# Patient Record
Sex: MALE | Race: WHITE
[De-identification: names, ages, dates, MRNs, and addresses within clinical notes are randomized per-mention and may not be internally consistent; named-entity substitution may affect disease eponyms.]

---

## 2018-06-11 ENCOUNTER — HOSPITAL ENCOUNTER (EMERGENCY)
Dept: HOSPITAL 25 - UCEAST | Age: 73
Discharge: HOME | End: 2018-06-11
Payer: MEDICARE

## 2018-06-11 ENCOUNTER — HOSPITAL ENCOUNTER (INPATIENT)
Dept: HOSPITAL 25 - ED | Age: 73
LOS: 5 days | Discharge: HOME | DRG: 606 | End: 2018-06-16
Attending: HOSPITALIST | Admitting: HOSPITALIST
Payer: MEDICARE

## 2018-06-11 VITALS — DIASTOLIC BLOOD PRESSURE: 57 MMHG | SYSTOLIC BLOOD PRESSURE: 102 MMHG

## 2018-06-11 DIAGNOSIS — Z88.1: ICD-10-CM

## 2018-06-11 DIAGNOSIS — Z87.01: ICD-10-CM

## 2018-06-11 DIAGNOSIS — I10: ICD-10-CM

## 2018-06-11 DIAGNOSIS — R53.83: ICD-10-CM

## 2018-06-11 DIAGNOSIS — L98.2: Primary | ICD-10-CM

## 2018-06-11 DIAGNOSIS — Z86.711: ICD-10-CM

## 2018-06-11 DIAGNOSIS — D61.818: ICD-10-CM

## 2018-06-11 DIAGNOSIS — D70.9: ICD-10-CM

## 2018-06-11 DIAGNOSIS — I77.819: ICD-10-CM

## 2018-06-11 DIAGNOSIS — Z87.442: ICD-10-CM

## 2018-06-11 DIAGNOSIS — Z79.01: ICD-10-CM

## 2018-06-11 DIAGNOSIS — I81: ICD-10-CM

## 2018-06-11 DIAGNOSIS — I77.6: ICD-10-CM

## 2018-06-11 DIAGNOSIS — Z72.89: ICD-10-CM

## 2018-06-11 DIAGNOSIS — L94.8: ICD-10-CM

## 2018-06-11 DIAGNOSIS — Z86.718: ICD-10-CM

## 2018-06-11 DIAGNOSIS — J45.909: ICD-10-CM

## 2018-06-11 DIAGNOSIS — R50.9: Primary | ICD-10-CM

## 2018-06-11 DIAGNOSIS — Z85.46: ICD-10-CM

## 2018-06-11 DIAGNOSIS — R91.8: ICD-10-CM

## 2018-06-11 DIAGNOSIS — Z86.14: ICD-10-CM

## 2018-06-11 DIAGNOSIS — R63.4: ICD-10-CM

## 2018-06-11 DIAGNOSIS — I82.891: ICD-10-CM

## 2018-06-11 DIAGNOSIS — D64.9: ICD-10-CM

## 2018-06-11 DIAGNOSIS — Z88.8: ICD-10-CM

## 2018-06-11 DIAGNOSIS — K76.0: ICD-10-CM

## 2018-06-11 DIAGNOSIS — Z80.3: ICD-10-CM

## 2018-06-11 DIAGNOSIS — R50.81: ICD-10-CM

## 2018-06-11 DIAGNOSIS — F41.9: ICD-10-CM

## 2018-06-11 DIAGNOSIS — I08.1: ICD-10-CM

## 2018-06-11 DIAGNOSIS — D68.61: ICD-10-CM

## 2018-06-11 DIAGNOSIS — N40.0: ICD-10-CM

## 2018-06-11 DIAGNOSIS — Z79.52: ICD-10-CM

## 2018-06-11 DIAGNOSIS — R21: ICD-10-CM

## 2018-06-11 LAB
BASOPHILS # BLD AUTO: 0 10^3/UL (ref 0–0.2)
EOSINOPHIL # BLD AUTO: 0 10^3/UL (ref 0–0.6)
HCT VFR BLD AUTO: 25 % (ref 42–52)
HGB BLD-MCNC: 8.8 G/DL (ref 14–18)
INR PPP/BLD: 1.66 (ref 0.77–1.02)
LYMPHOCYTES # BLD AUTO: 0.2 10^3/UL (ref 1–4.8)
MCH RBC QN AUTO: 37 PG (ref 27–31)
MCHC RBC AUTO-ENTMCNC: 35 G/DL (ref 31–36)
MCV RBC AUTO: 106 FL (ref 80–94)
MONOCYTES # BLD AUTO: 0.4 10^3/UL (ref 0–0.8)
NEUTROPHILS # BLD AUTO: 2.2 10^3/UL (ref 1.5–7.7)
PLATELET # BLD AUTO: 90 10^3/UL (ref 150–450)
RBC # BLD AUTO: 2.38 10^6/UL (ref 4–5.4)
WBC # BLD AUTO: 2.7 10^3/UL (ref 3.5–10.8)

## 2018-06-11 PROCEDURE — 83615 LACTATE (LD) (LDH) ENZYME: CPT

## 2018-06-11 PROCEDURE — 85025 COMPLETE CBC W/AUTO DIFF WBC: CPT

## 2018-06-11 PROCEDURE — 36415 COLL VENOUS BLD VENIPUNCTURE: CPT

## 2018-06-11 PROCEDURE — 88187 FLOWCYTOMETRY/READ 2-8: CPT

## 2018-06-11 PROCEDURE — 88313 SPECIAL STAINS GROUP 2: CPT

## 2018-06-11 PROCEDURE — 82595 ASSAY OF CRYOGLOBULIN: CPT

## 2018-06-11 PROCEDURE — 88311 DECALCIFY TISSUE: CPT

## 2018-06-11 PROCEDURE — 85097 BONE MARROW INTERPRETATION: CPT

## 2018-06-11 PROCEDURE — 88346 IMFLUOR 1ST 1ANTB STAIN PX: CPT

## 2018-06-11 PROCEDURE — 88350 IMFLUOR EA ADDL 1ANTB STN PX: CPT

## 2018-06-11 PROCEDURE — 85652 RBC SED RATE AUTOMATED: CPT

## 2018-06-11 PROCEDURE — 85060 BLOOD SMEAR INTERPRETATION: CPT

## 2018-06-11 PROCEDURE — 80053 COMPREHEN METABOLIC PANEL: CPT

## 2018-06-11 PROCEDURE — G0378 HOSPITAL OBSERVATION PER HR: HCPCS

## 2018-06-11 PROCEDURE — 87798 DETECT AGENT NOS DNA AMP: CPT

## 2018-06-11 PROCEDURE — 94640 AIRWAY INHALATION TREATMENT: CPT

## 2018-06-11 PROCEDURE — 85045 AUTOMATED RETICULOCYTE COUNT: CPT

## 2018-06-11 PROCEDURE — 82585 ASSAY OF CRYOFIBRINOGEN: CPT

## 2018-06-11 PROCEDURE — 86160 COMPLEMENT ANTIGEN: CPT

## 2018-06-11 PROCEDURE — G0463 HOSPITAL OUTPT CLINIC VISIT: HCPCS

## 2018-06-11 PROCEDURE — 86162 COMPLEMENT TOTAL (CH50): CPT

## 2018-06-11 PROCEDURE — 99212 OFFICE O/P EST SF 10 MIN: CPT

## 2018-06-11 PROCEDURE — 99283 EMERGENCY DEPT VISIT LOW MDM: CPT

## 2018-06-11 PROCEDURE — 86880 COOMBS TEST DIRECT: CPT

## 2018-06-11 PROCEDURE — 83516 IMMUNOASSAY NONANTIBODY: CPT

## 2018-06-11 PROCEDURE — 84484 ASSAY OF TROPONIN QUANT: CPT

## 2018-06-11 PROCEDURE — 81479 UNLISTED MOLECULAR PATHOLOGY: CPT

## 2018-06-11 PROCEDURE — 88305 TISSUE EXAM BY PATHOLOGIST: CPT

## 2018-06-11 PROCEDURE — 85610 PROTHROMBIN TIME: CPT

## 2018-06-11 PROCEDURE — 81003 URINALYSIS AUTO W/O SCOPE: CPT

## 2018-06-11 PROCEDURE — 88184 FLOWCYTOMETRY/ TC 1 MARKER: CPT

## 2018-06-11 PROCEDURE — 83605 ASSAY OF LACTIC ACID: CPT

## 2018-06-11 PROCEDURE — 38222 DX BONE MARROW BX & ASPIR: CPT

## 2018-06-11 PROCEDURE — 83036 HEMOGLOBIN GLYCOSYLATED A1C: CPT

## 2018-06-11 PROCEDURE — 86658 ENTEROVIRUS ANTIBODY: CPT

## 2018-06-11 PROCEDURE — 71045 X-RAY EXAM CHEST 1 VIEW: CPT

## 2018-06-11 PROCEDURE — 88188 FLOWCYTOMETRY/READ 9-15: CPT

## 2018-06-11 PROCEDURE — 82607 VITAMIN B-12: CPT

## 2018-06-11 PROCEDURE — 86645 CMV ANTIBODY IGM: CPT

## 2018-06-11 PROCEDURE — 85730 THROMBOPLASTIN TIME PARTIAL: CPT

## 2018-06-11 PROCEDURE — 99232 SBSQ HOSP IP/OBS MODERATE 35: CPT

## 2018-06-11 PROCEDURE — 86225 DNA ANTIBODY NATIVE: CPT

## 2018-06-11 PROCEDURE — 87040 BLOOD CULTURE FOR BACTERIA: CPT

## 2018-06-11 PROCEDURE — 88189 FLOWCYTOMETRY/READ 16 & >: CPT

## 2018-06-11 PROCEDURE — 88271 CYTOGENETICS DNA PROBE: CPT

## 2018-06-11 PROCEDURE — 86140 C-REACTIVE PROTEIN: CPT

## 2018-06-11 PROCEDURE — 80048 BASIC METABOLIC PNL TOTAL CA: CPT

## 2018-06-11 PROCEDURE — 86038 ANTINUCLEAR ANTIBODIES: CPT

## 2018-06-11 PROCEDURE — 86747 PARVOVIRUS ANTIBODY: CPT

## 2018-06-11 PROCEDURE — 86644 CMV ANTIBODY: CPT

## 2018-06-11 RX ADMIN — MOMETASONE FUROATE AND FORMOTEROL FUMARATE DIHYDRATE SCH: 100; 5 AEROSOL RESPIRATORY (INHALATION) at 20:18

## 2018-06-11 RX ADMIN — CIPROFLOXACIN SCH MG: 500 TABLET, FILM COATED ORAL at 21:46

## 2018-06-11 RX ADMIN — MOMETASONE FUROATE AND FORMOTEROL FUMARATE DIHYDRATE SCH PUFF: 100; 5 AEROSOL RESPIRATORY (INHALATION) at 20:39

## 2018-06-11 RX ADMIN — AMITRIPTYLINE HYDROCHLORIDE SCH MG: 10 TABLET, FILM COATED ORAL at 21:46

## 2018-06-11 NOTE — UC
Skin Complaint HPI





- HPI Summary


HPI Summary: 


patient is on prednisone for an auto immune disorder (possibly vasculitis) and 

Cipro---patient has had fevers, and is developing more crusting rash on face, 

arms, hands and neck--, temp 101.5 last night   neither Dr. Diamond or Ashish 

were available to see today so he was advised to go to urgent care








- History of Current Complaint


Chief Complaint: UCGeneralIllness


Time Seen by Provider: 06/11/18 10:33


Stated Complaint: FEVER, SWELLING ON LIPS


Hx Obtained From: Patient


Onset/Duration: Gradual Onset, Lasting Weeks, Worse Since - last night


Timing: Constant


Pain Intensity: 0


Pain Scale Used: 0-10 Numeric


Location: Diffuse


Character: Redness, Raised


Aggravating Factor(s): Nothing


Alleviating Factor(s): Nothing


Associated Signs & Symptoms: Positive: Rash





- Allergy/Home Medications


Allergies/Adverse Reactions: 


 Allergies











Allergy/AdvReac Type Severity Reaction Status Date / Time


 


allopurinol Allergy  Rash Verified 06/11/18 11:47


 


azithromycin [From Zithromax] Allergy  Rash Verified 06/11/18 11:47


 


ssri Allergy Intermediate Hallucinati Uncoded 06/11/18 11:47





   ons  











Home Medications: 


 Home Medications





Amitriptyline TAB* [Elavil TAB*] 20 mg PO BEDTIME 06/11/18 [History Confirmed 06 /11/18]


Budesonide/Formote 80/4.5(NF) [Symbicort 80/4.5 (NF)] 2 puff INH BID 06/11/18 [

History Confirmed 06/11/18]


Ciprofloxacin TAB* [Cipro 500 MG TAB*] 1 tab PO BID 06/11/18 [History Confirmed 

06/11/18]


Docusate Sodium [Stool Softener] 100 mg PO DAILY PRN 06/11/18 [History 

Confirmed 06/11/18]


Ibuprofen TAB* [Advil TAB*] 600 mg PO Q6HR PRN 06/11/18 [History Confirmed 06/11 /18]


LORazepam [Ativan 0.5 MG TAB] 0.5 mg PO DAILY PRN 06/11/18 [History Confirmed 06 /11/18]


Magnesium Hydroxide LIQ* [Milk of Magnesia LIQ*] 30 ml PO DAILY PRN 06/11/18 [

History Confirmed 06/11/18]


Omeprazole CAP* [Prilosec CAP* 20 MG] 20 mg PO DAILY 06/11/18 [History 

Confirmed 06/11/18]


Oxybutynin Chloride [Oxybutynin Chloride ER] 10 mg PO DAILY 06/11/18 [History 

Confirmed 06/11/18]


Rivaroxaban TAB(*) [Xarelto 20 mg] 20 mg PO DAILY 06/11/18 [History Confirmed 06 /11/18]


predniSONE TAB* [Deltasone 10 MG TAB*] 30 mg PO DAILY 06/11/18 [History 

Confirmed 06/11/18]











Review of Systems


Constitutional: Fever, Chills, Fatigue


Skin: Rash


Eyes: Negative


ENT: Negative


Respiratory: Negative


Cardiovascular: Negative


Gastrointestinal: Negative


Genitourinary: Negative


Motor: Negative


Neurovascular: Negative


Musculoskeletal: Negative


Neurological: Negative


Psychological: Negative


Is Patient Immunocompromised?: No


All Other Systems Reviewed And Are Negative: Yes





PMH/Surg Hx/FS Hx/Imm Hx


Previously Healthy: No - DVT, Vasculitis


Respiratory History: Asthma


Cancer History: Prostate Cancer





- Surgical History


Surgical History: Yes


Surgery Procedure, Year, and Place: LT INGUINAL HERNIA REPAIR, LT KIDNEY SX FOR 

NEPHROLITHIASIS REMOVED A LARGE STONE 42 YRS AGO, APPENDECTOMY 20 YEARS AGO.  

prostate Bx 06/17/14 at [facility] in [facility]; PROSTATECTOMY 8/2014; 2X 

COLONOSCOPIES 3/21/16 POLYP REMOVED -BENIGN -[facility].  URTHRA STRICTURE - REPAIR -11 /2015.  CRANIOTOMY - FOR I & D OF INFECTION- TITANIUM SCREWS IN SKULL dvt right 

leg and lung





- Family History


Known Family History: Positive: None





- Social History


Occupation: Retired


Lives: With Family


Alcohol Use: Occasionally


Substance Use Type: None


Smoking Status (MU): Never Smoked Tobacco





- Immunization History


Most Recent Influenza Vaccination: 2013


Most Recent Tetanus Shot: unknown


Most Recent Pneumonia Vaccination: 2006





Physical Exam


Triage Information Reviewed: Yes


Appearance: No Pain Distress, Ill-Appearing, Thin


Vital Signs: 


 Initial Vital Signs











Temp  100.4 F   06/11/18 10:18


 


Pulse  135   06/11/18 10:18


 


Resp  22   06/11/18 10:18


 


BP  102/57   06/11/18 10:18


 


Pulse Ox  100   06/11/18 10:18











Vital Signs Reviewed: Yes


Eye Exam: Normal


Eyes: Positive: Conjunctiva Clear


ENT Exam: Normal


ENT: Positive: Normal ENT inspection, Hearing grossly normal.  Negative: Trismus

, Muffled voice, Hoarse voice


Dental Exam: Normal


Neck exam: Normal


Neck: Positive: Supple, Nontender


Respiratory Exam: Normal


Respiratory: Positive: Chest non-tender, No respiratory distress, No accessory 

muscle use


Cardiovascular Exam: Normal


Cardiovascular: Positive: Pulses Normal, Brisk Capillary Refill, Tachycardia


Musculoskeletal Exam: Normal


Musculoskeletal: Positive: Strength Intact, ROM Intact


Neurological Exam: Normal


Neurological: Positive: Alert, Muscle Tone Normal


Psychological Exam: Normal


Skin Exam: Other


Skin: Positive: rashes





Course/Dx





- Course


Course Of Treatment: to ED with wife driving for further care ,and evaluation





- Diagnoses


Provider Diagnoses: Fever of unknown origin





Discharge





- Sign-Out/Discharge


Documenting (check all that apply): Discharge/Admit/Transfer





- Discharge Plan


Condition: Stable


Disposition: HOME


Patient Education Materials:  Fever in Adults (ED), Autoimmune Disease (ED)


Referrals: 


Angelito Villanueva MD [Primary Care Provider] - 


Additional Instructions: 


Please go directly to the emergency department  for comprehensive assessment of 

symptoms





- Billing Disposition and Condition


Condition: STABLE


Disposition: Home

## 2018-06-11 NOTE — HP
CC:  Dr. Bassem Diamond; Dr. Angelito Villanueva; Dr. Tu Gonsalves *

 

HISTORY AND PHYSICAL:

 

DATE OF ADMISSION:  06/11/18

 

PRIMARY CARE PROVIDER:  Dr. Angelito Villanueva.

 

ATTENDING PHYSICIAN:  Zoey Caldwell DO * (dictated by Lara Acosta NP).

 

CHIEF COMPLAINT:  Fever, fatigue, and rash.

 

HISTORY OF PRESENT ILLNESS:  Mr. Mehta is a 73-year-old male with past medical 
history significant for hypertension, BPH, MRSA pneumonia with subsequent MRSA 
brain abscess, DVT, PE, portal vein thrombus, prostate cancer, anxiety, 
pulmonary nodule, and macrocytic anemia who has underwent an extensive workup 
over the last few months.  The patient reports consistent weight loss and 
fatigue.  Additionally, he has intermittently had a rash on his torso (
previously described as a diffuse papular rash) and now on his face, hands, 
soles, back of his neck and arms.  In the past, it was felt that the patient 
had a diffuse inflammatory process happening.  He also underwent Pulmonology 
and Infectious Disease consult and workup due to his pulmonary nodule and 
diffuse papular rash. 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 
vasculitis with renal involvement. It was recommended that he undergo a renal 
biopsy.  At this time, it is unclear why, but he has not undergone this biopsy.
  The patient continues to follow with Dr. Gonsalves for hematological issues and 
Dr. 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  The patient has been on 
60 mg of prednisone and which he was started on around 03/23/18 or 03/24/18, 
when he had a positive ANCA.  He was then decreased around the time of 05/05/18 
to 40 mg oral daily.  The patient was feeling so well that he decreased himself 
down to 30 mg oral daily approximately 1 to 2 weeks ago. Exactly 1 week ago, on 
Monday, the patient developed a 101 fever.  He was seen by his primary care 
provider and started on Cipro.  The patient had been feeling relatively well 
for the last week and then this morning, developed a 101.5 temperature 
overnight.  His wife gave him Motrin and the fever resolved.  He reports fevers 
and chills have now resolved.  He denies chest pain, cough, shortness of breath
, nausea, vomiting, diarrhea, abdominal pain.  He denies any known tick bites.  
He reports having a workup for possible Lyme's that was negative.  According to 
the EMR, the patient's Lyme workup was all negative back in March of this year.
  The patient states that he has not seen Dermatology for his rash, but has 
seen multiple other specialities.  He reports a rash for approximately 1 week.  
This includes starting with blister-like areas on his lip bilateral and has 
progressed over the last week to being on the backs of his hands and on his 
arms and is a raised, slightly erythematous rash.  On his neck, he has raised 
rashes that appeared to be fluid filled.  He has areas on his ears that 
appeared to be raised and look like hives.  His toes and palms have a small 
dark pigmented areas.  He has no rash on his chest or back.  Denies any sores 
in his mouth at this time.  Due to his persistent rash, he tried to get into 
Dr. Gonsalves and Dr. Diamond's office today and was unable to and they deferred him 
to Urgent Care.  The patient was seen at Urgent Care, who then deferred the 
patient to the emergency room.

 

While in the emergency room, the patient had labs that were remarkable for 
white blood cell count of 7.9, hemoglobin of 8.8, hematocrit 25, platelet count 
90.  CRP of 100.61.  His basic metabolic panel was unremarkable.  He had a 
negative urinalysis.  He had a chest x-ray showing no acute disease.  Dr. Munoz contacted Dr. Diamond who felt that the patient should be admitted and 
stated he will follow up and consult on the patient tomorrow.  The patient has 
been afebrile in the emergency room.  The Hospitalists were asked to evaluate 
the patient for admission.

 

PAST MEDICAL HISTORY:

1.  Hypertension.

2.  BPH.

3.  MRSA.

4.  Pneumonia with subsequent MRSA brain abscess.

5.  History of DVT.

6.  History of PE.

7.  Portal vein thrombosis.

8.  Prostate cancer.

9.  Anxiety.

10.  Pulmonary nodules.

11.  Macrocytic anemia.

 

PAST SURGICAL HISTORY:

1.  Status post left inguinal hernia repair.

2.  Status post appendectomy.

3.  Status post prostatectomy.

4.  Status post craniotomy with I and D of abscess.

 

HOME MEDICATIONS:  Include:

1.  Prednisone 30 mg oral daily.

2.  Xarelto 20 mg oral daily.

3.  Ditropan 10 mg oral daily.

4.  Prilosec 20 mg oral daily.

5.  Milk of magnesia 30 mL oral daily as needed for constipation.

6.  Lorazepam 0.5 mg oral every 6 hours as needed for anxiety.

7.  Ibuprofen 400 mg oral every 6 hours as needed for fever or pain.

8.  Colace 100 mg oral daily as needed for constipation.

9.  Cipro 1 tablet oral twice daily.

10.  Calcium 600 plus vitamin D oral daily.

11.  Symbicort 80/4.5 two puffs inhalation twice daily.

12.  Amitriptyline 20 mg oral daily at bedtime.

 

ALLERGIES:  ALLOPURINOL, AZITHROMYCIN, and SSRIs.

 

FAMILY HISTORY:  He denies any family history of coronary artery disease and 
diabetes mellitus.  His paternal grandmother had a history of breast cancer.

 

SOCIAL HISTORY:  He denies tobacco or recreational drug use.  He occasionally 
drinks alcohol.  His wife, Danica Mehta, will be his surrogate decision maker 
in the event he is unable to make decisions for himself.

 

REVIEW OF SYSTEMS:  I performed an 11-point review of systems.  All the 
pertinent positives and negatives are mentioned in the history of present 
illness.  The remaining review of systems are negative.

 

                               PHYSICAL EXAMINATION

 

GENERAL APPEARANCE:  The patient is alert, pleasant and appears to be in no 
acute distress.

 

VITAL SIGNS:  Temperature 98.5, heart rate 91, respiratory rate 15, O2 sat 99% 
on room air, blood pressure 117/69.

 

HEENT:  Normocephalic, atraumatic.  Pupils are equal and reactive to light. 
Extraocular movements are intact.

 

NECK:  Supple.

 

RESPIRATORY:  There is no accessory muscle use.  The lungs are clear to 
auscultation bilaterally.

 

CARDIOVASCULAR:  Regular rate and rhythm.  S1 and S2 present.  There are no 
murmurs, rubs, or gallops heard.

 

ABDOMEN:  Soft, nontender, nondistended.  There are bowel sounds present x4.

 

EXTREMITIES:  There is no lower extremity edema.  DP and PT pulses are 2+ and 
symmetric.

 

MUSCULOSKELETAL:  There is no clubbing or cyanosis noted.  The patient exhibits 
good strength in all extremities.

 

NEUROLOGIC:  The patient is alert and oriented x4.  Cranial nerves II through 
XII are grossly intact.

 

PSYCHOLOGICAL:  The patient is calm and cooperative.

 

SKIN:  Please refer to the description up in the HPI of his rash.

 

 DIAGNOSTIC STUDIES/LABORATORY DATA:  Sodium 133, potassium 3.9, chloride 96, 
CO2 32, BUN 18, creatinine 0.99, glucose 124.  White blood cell count 2.7, 
hemoglobin 8.8, hematocrit 25, platelet count 90.  INR 1.66.  Lactic acid 1.20.
  CRP of 100.6.

 

Urinalysis is negative.

 

Chest x-ray from today.  Radiologist's impression:  No evidence for acute 
disease.

 

IMPRESSION:  Mr. Mehta is a 73-year-old male with past medical history 
significant for hypertension, benign prostatic hypertrophy, MRSA, pneumonia 
with subsequent MRSA brain abscess, deep venous thrombosis, pulmonary embolus, 
protal vein thrombus, prostate carcinoma, anxiety, pulmonary nodule, macrocytic 
anemia who presented to the emergency room with rash and fever.  He will be 
admitted as an observation for fever and rash.

 

ASSESSMENT AND PLAN:

1.  Fever and rash.  I suspect this has to do with the patient's suspected ANCA
- mediated vasculitis with renal involvement.  I am going to continue the 
patient on his current dose of prednisone.  Dr. Diamond will see the patient in 
the morning. The patient has had multiple consults including Pulmonology and 
Infectious Disease. He has not been seen in consult by Dermatology.  I 
recommend having the patient see Dermatology if they cannot determine exactly 
what is causing this rash.

2.  Hematological abnormalities.  The patient has leukopenia, macrocytic anemia
, and thrombocytopenia.  These appeared to be at his baseline.  He should 
continue to follow with Dr. Gonsalves for this.  The patient is actually slightly 
more anemic than his baseline.  I will check his stool for occult blood.  He 
denies any signs of bleeding.

3.  History of deep venous thrombosis and, pulmonary embolus.  The patient 
should be continued on his home Xarelto.

4.  Prostate cancer.  The patient should continue to follow with Urology.

5.  Pulmonary nodule.  The patient should continue to follow with Pulmonology 
and have repeat images as previously planned.

6.  Anxiety.  The patient will be continued on his home as needed lorazepam.

7.  Hypertension.  The patient is no longer on medications.  He is currently 
normotensive.  We will continue to follow.

8.  Fluids, electrolytes, and nutrition:  The patient will be on a heart-
healthy diet.

9.  Code status:  Full code.

10.  DVT prophylaxis:  He is at highest risk and will be continued on Xarelto.

11.  Disposition:  Observation.

 

TIME SPENT:  Time for this admission was approximately 60 minutes, greater than 
half of that was spent with the patient discussing medications, past medical 
history, the events leading up to his arrival today and performing a physical 
examination.

 

The case has been reviewed with the attending, Dr. Caldwell, who agrees with the 
plan of care.

 

Reviewed by HIMANSHU LYNCH-KEILY  06/14/18  0951

 

[id number]/CPS #: [id number]

[email or web address]

## 2018-06-11 NOTE — RAD
INDICATION:  Sepsis.



COMPARISON:  Comparison is made to prior study from June 23, 2014.



TECHNIQUE: A portable view of the chest was obtained.



FINDINGS: Cardiac and mediastinal contours appear to be within normal limits.



There is a linear density in the right upper lobe which is unchanged from the prior exam

most consistent with scarring. The lungs are otherwise clear. No pleural effusion is seen.



IMPRESSION:  NO EVIDENCE FOR ACUTE DISEASE.

## 2018-06-12 LAB
BASOPHILS # BLD AUTO: 0 10^3/UL (ref 0–0.2)
EOSINOPHIL # BLD AUTO: 0 10^3/UL (ref 0–0.6)
HCT VFR BLD AUTO: 24 % (ref 42–52)
HGB BLD-MCNC: 8.6 G/DL (ref 14–18)
LYMPHOCYTES # BLD AUTO: 0.5 10^3/UL (ref 1–4.8)
MCH RBC QN AUTO: 37 PG (ref 27–31)
MCHC RBC AUTO-ENTMCNC: 35 G/DL (ref 31–36)
MCV RBC AUTO: 105 FL (ref 80–94)
MONOCYTES # BLD AUTO: 0.2 10^3/UL (ref 0–0.8)
NEUTROPHILS # BLD AUTO: 1.1 10^3/UL (ref 1.5–7.7)
NRBC # BLD AUTO: 0 10^3/UL
NRBC BLD QL AUTO: 0.1
PLATELET # BLD AUTO: 90 10^3/UL (ref 150–450)
RBC # BLD AUTO: 2.31 10^6/UL (ref 4–5.4)
WBC # BLD AUTO: 1.9 10^3/UL (ref 3.5–10.8)

## 2018-06-12 PROCEDURE — 0HBGXZZ EXCISION OF LEFT HAND SKIN, EXTERNAL APPROACH: ICD-10-PCS | Performed by: HOSPITALIST

## 2018-06-12 RX ADMIN — OXYBUTYNIN CHLORIDE SCH MG: 5 TABLET, EXTENDED RELEASE ORAL at 08:18

## 2018-06-12 RX ADMIN — MAGNESIUM HYDROXIDE PRN ML: 400 SUSPENSION ORAL at 12:51

## 2018-06-12 RX ADMIN — SODIUM CHLORIDE SCH MLS/HR: 900 IRRIGANT IRRIGATION at 18:35

## 2018-06-12 RX ADMIN — CIPROFLOXACIN SCH MG: 500 TABLET, FILM COATED ORAL at 08:16

## 2018-06-12 RX ADMIN — AMITRIPTYLINE HYDROCHLORIDE SCH MG: 10 TABLET, FILM COATED ORAL at 20:33

## 2018-06-12 RX ADMIN — METHYLPREDNISOLONE SODIUM SUCCINATE SCH MG: 125 INJECTION, POWDER, FOR SOLUTION INTRAMUSCULAR; INTRAVENOUS at 16:54

## 2018-06-12 RX ADMIN — MOMETASONE FUROATE AND FORMOTEROL FUMARATE DIHYDRATE SCH PUFF: 100; 5 AEROSOL RESPIRATORY (INHALATION) at 08:27

## 2018-06-12 RX ADMIN — RIVAROXABAN SCH MG: 20 TABLET, FILM COATED ORAL at 08:15

## 2018-06-12 RX ADMIN — SODIUM CHLORIDE SCH MLS/HR: 900 IRRIGANT IRRIGATION at 03:11

## 2018-06-12 RX ADMIN — SODIUM CHLORIDE SCH MLS/HR: 900 IRRIGANT IRRIGATION at 08:14

## 2018-06-12 RX ADMIN — DOCUSATE SODIUM PRN MG: 100 CAPSULE, LIQUID FILLED ORAL at 12:51

## 2018-06-12 RX ADMIN — METHYLPREDNISOLONE SODIUM SUCCINATE SCH MG: 125 INJECTION, POWDER, FOR SOLUTION INTRAMUSCULAR; INTRAVENOUS at 10:04

## 2018-06-12 RX ADMIN — OMEPRAZOLE SCH MG: 20 CAPSULE, DELAYED RELEASE ORAL at 08:16

## 2018-06-12 RX ADMIN — MOMETASONE FUROATE AND FORMOTEROL FUMARATE DIHYDRATE SCH PUFF: 100; 5 AEROSOL RESPIRATORY (INHALATION) at 19:54

## 2018-06-12 NOTE — PN
Subjective


Date of Service: 06/12/18


Interval History: 


HOSPITALIST PROGRESS NOTE


Patient seen and examined at bedside. Care reviewed and d/w Tatum Gregory RN.


He feels better today. States his rash does not hurt and does not itch. Started 

insidiously about 2-3 weeks ago, with some lesions on his hands and has since 

progressed.


Family History: Unchanged from Admission


Social History: Unchanged from Admission


Past Medical History: Unchanged from Admission





Objective


Active Medications: 








Acetaminophen (Tylenol Tab*)  650 mg PO Q4H PRN


   PRN Reason: FEVER/PAIN


Amitriptyline HCl (Elavil Tab*)  20 mg PO BEDTIME [facility]


   Last Admin: 06/11/18 21:46 Dose:  20 mg


Docusate Sodium (Colace Cap*)  100 mg PO DAILY PRN


   PRN Reason: CONSTIPATION


   Last Admin: 06/12/18 12:51 Dose:  100 mg


Sodium Chloride (Ns 0.9% 1000 Ml*)  1,000 mls @ 100 mls/hr IV PER RATE [facility]


   Last Admin: 06/12/18 08:14 Dose:  100 mls/hr


Lorazepam (Ativan Tab(*))  0.5 mg PO Q6H PRN


   PRN Reason: ANXIETY


Magnesium Hydroxide (Milk Of Magnesia Liq*)  30 ml PO DAILY PRN


   PRN Reason: CONSTIPATION


   Last Admin: 06/12/18 12:51 Dose:  30 ml


Methylprednisolone Sodium Succinate (Solu-Medrol 125mg *)  60 mg IV Q8H [facility]


   Last Admin: 06/12/18 10:04 Dose:  60 mg


Mometasone Furoate/Formoterol Fumar (Dulera 100/5 Mdi*)  2 puff INH BID [facility]


   Last Admin: 06/12/18 08:27 Dose:  2 puff


Omeprazole (Prilosec Cap*)  20 mg PO DAILY [facility]


   Last Admin: 06/12/18 08:16 Dose:  20 mg


Oxybutynin Chloride (Ditropan Xl Tab*)  10 mg PO DAILY [facility]


   Last Admin: 06/12/18 08:18 Dose:  10 mg


Rivaroxaban (Xarelto(*))  20 mg PO DAILY [facility]


   Last Admin: 06/12/18 08:15 Dose:  20 mg








 Vital Signs - 8 hr











  06/12/18





  11:30


 


Temperature 99.5 F


 


Pulse Rate 110


 


Respiratory 18





Rate 


 


Blood Pressure 122/67





(mmHg) 


 


O2 Sat by Pulse 99





Oximetry 











Oxygen Devices in Use Now: None


Appearance: Pleasant gentleman sitting up in bed in NAD.


Eyes: No Scleral Icterus


Ears/Nose/Mouth/Throat: Mucous Membranes Moist


Neck: Trachea Midline


Respiratory: Symmetrical Chest Expansion and Respiratory Effort, Clear to 

Auscultation


Cardiovascular: RRR - Normal S1 and S2


Abdominal: NL Sounds; No Tenderness; No Distention


Skin: - - Maculo papular rash on both hands, with palmar lesions, some vesicles 

on his lips. Other scattered macular lesions on chest, back of neck, feet and 

plantar area


Neurological: Alert and Oriented x 3, NL Muscle Strength and Tone


Result Diagrams: 


 06/12/18 06:19





 06/12/18 06:18





Assess/Plan/Problems-Billing


Assessment: 


Mr. Mehta is a 72yo F with PMH of HTN, BPH, MRSA pneumonia with subsequent brain 

abscess, PE/DVT and portal vein thrombosis, prostate CA, pulmonary nodules, who 

presented to ED with fever and rash.





- Patient Problems


(1) Neutropenic fever


Comment: - WBC down to 1.9k with 1.1k neutrophils - will place on neutropenic 

precautions.


- Start Cefepime empirically.


- Follow blood cultures.


- Unclear if this is infectious (patient immunnosuppressed on high dose steroids

) or related to auto-immune disorder.


- Hematology consult requested as patient is pancytopenic.   





(2) Autoimmune disorder


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 

(p ANCA positive 03/18 but MPO and PR3 negative) vs other.


- Also has h/o DVT/PE, portal vein thrombosis with positive anticardiolipine 

IgM in 12/17, suggestive of antiphospholipid syndrome.


- Rheum input appreciated - will increase steroids and send autoimmune tests.   





(3) Rash


Comment: - Autoimmune vs viral.


- Surgery consult requested for biopsy.


- ID and Derm consulted also.   





(4) DVT prophylaxis


Comment: - Xarelto.   





(5) Full code status





Status and Disposition: 


Inpatient.

## 2018-06-12 NOTE — PN
Progress Note





- Progress Note


Date of Service: 18


SOAP: 


Subjective:


[]Well known to me and followed for oscillating pancytopenia and thrombosis. 

Has had extensive evaluation and ultimatly found to have a primary immune 

syndrome. Has been on steroids, then taper. Seen [facility]. Admission with 

fever and rash. Since admission has had skin biopsy and been on antibiotics and 

high dose steroids. He is improving today.





CBC significant for  in ANC to 1100 and Plts to 90,000











Acetaminophen (Tylenol Tab*)  650 mg PO Q4H PRN


   PRN Reason: FEVER/PAIN


Amitriptyline HCl (Elavil Tab*)  20 mg PO BEDTIME [facility]


   Last Admin: 18 21:46 Dose:  20 mg


Docusate Sodium (Colace Cap*)  100 mg PO DAILY PRN


   PRN Reason: CONSTIPATION


   Last Admin: 18 12:51 Dose:  100 mg


Sodium Chloride (Ns 0.9% 1000 Ml*)  1,000 mls @ 75 mls/hr IV PER RATE [facility]


   Last Admin: 18 18:35 Dose:  75 mls/hr


Lorazepam (Ativan Tab(*))  0.5 mg PO Q6H PRN


   PRN Reason: ANXIETY


Magnesium Hydroxide (Milk Of Magnesia Liq*)  30 ml PO DAILY PRN


   PRN Reason: CONSTIPATION


   Last Admin: 18 12:51 Dose:  30 ml


Methylprednisolone Sodium Succinate (Solu-Medrol 125mg *)  60 mg IV Q8H [facility]


   Last Admin: 18 16:54 Dose:  60 mg


Mometasone Furoate/Formoterol Fumar (Dulera 100/5 Mdi*)  2 puff INH BID [facility]


   Last Admin: 18 08:27 Dose:  2 puff


Omeprazole (Prilosec Cap*)  20 mg PO DAILY [facility]


   Last Admin: 18 08:16 Dose:  20 mg


Oxybutynin Chloride (Ditropan Xl Tab*)  10 mg PO DAILY [facility]


   Last Admin: 18 08:18 Dose:  10 mg


Propranolol HCl (Inderal La Cap*)  80 mg PO DAILY PRN


   PRN Reason: Anxiety


   Last Admin: 18 17:54 Dose:  80 mg


Rivaroxaban (Xarelto(*))  20 mg PO DAILY [facility]


   Last Admin: 18 08:15 Dose:  20 mg














Objective:


[]


 Vital Signs











Temp Pulse Resp BP Pulse Ox


 


 97.4 F   105   18   137/73   100 


 


 18 15:52  18 15:52  18 15:52  18 15:52  18 15:52





HEEN: mucosa moist, no lesions


skin with diffuse rash, nodular areas and papules on hands BL


CTA


RRR S1S2


+BS, no HSM


Ext good pulses no edema





Assessment:


[]73 year old with mulitple medical problems over past year including diagnosis 

of chronic liver disease, DT and PE 10/2017, pancytopenia, fatigue and 

connective  tissue disease with migratory LAD, pulmonary nodules and now 

vasculitic appearing rash. 








Plan:


[]1. Agree with management including biopsy and steroids


2. ANC at low end of his range, follow on steroids


3. Thrombocytopenia may be in part consumptive, follow and hold Rovaroxaban for 

< 50,000


4. Anemia. Re-check Oralia, LDH and Tx for Hgb < 8.0

## 2018-06-12 NOTE — PN
Progress Note





- Progress Note


Date of Service: 06/12/18


Note: 





Surgery Progress: (full note dictated)





Skin bx taken from previously marked area on dorsum of Left hand (2% plain 

lidocaine local). Also applied tourniquet to Left ring finger to facilitate 

removal of his wedding ring. Patient tolerated well.

## 2018-06-12 NOTE — CONS
CONSULTATION REPORT:

 

DATE OF CONSULT:  06/12/18

 

REQUESTING PHYSICIAN:  Dr. Nevarez.

 

CONSULTING SERVICE:  Infectious Disease.

 

REASON FOR CONSULTATION:  Fever and rash.

 

IMPRESSION:

1.  Ten days of intermittent fevers, rash on dorsum and palm of hands, dorsum 
and plantar feet; erythematous, non painful, non pleuritic; some eschar in the 
corner of his mouth, which may be unrelated to the other process.  He has had 
fever, which I think is connected to the rash process.  I think a viral 
infection is high on the list in the setting of immunocompromise from high dose 
corticosteroids.  I agree with CMV in the differential given the pancytopenia, 
other considerations are parvovirus and coxsackie virus.  A pox virus is a 
consideration including molluscum for the hand lesions.  Does not look like a 
small vessel vasculitis, so I think [place] spotted fever is unlikely.

2.  Recent corticosteroid use for an undefined illness of sweats, anorexia, 
weight loss and fatigue, much improved with corticosteroids.  He had a positive 
ANCA, but negative MPO and PR3 antibodies.

3.  Pancytopenia with macrocytic anemia, question B12 if not related to the 
other underlying inflammatory process.

 

HISTORY OF PRESENT ILLNESS:  This is a 73-year-old man, who 3 or 4 months ago 
developed fevers, chills, sweats, weight loss, anorexia.  Extensive outpatient 
workup was negative.  He had impressive improvement with corticosteroid 
treatment, which has been tapered down to 30 mg from 60 over the last couple of 
months soon after he developed fevers, rash on palms, soles, dorsum of the hand 
and feet as well, decrease in energy, appetite has been okay.  He has had no 
sores of the mouth or genitals, but he has had about a month of some eschar in 
both the corners of his mouth.  Has not tried anything topical for it.  His 
primary prescribed ciprofloxacin over the last week or so, did not notice any 
change in the fever.  He has not been around any kids that were sick before 
this started and no cough, no abdominal pain, no diarrhea, no dysuria.  Because 
of worsening fever and rash, he came to the hospital yesterday, he was 
pancytopenic and MCV of 106.  His white blood cell count here was 2, AMC 2200, 
it is a 1100 today.  He has had no fevers since he has been here.  CRP has been 
100.  He has been given methylprednisolone IV.  The rash is not painful, does 
not itch, nothing make him better or worse.  It does seems, he thinks to be 
spreading slowly over the last few days.  Has not had anything like this in the 
past.

 

PAST MEDICAL HISTORY:

1.  Autoimmune or autoinflammatory process improving on corticosteroids.

2.  Hypertension.

3.  Benign prostatic hypertrophy.

4.  History of MRSA brain abscess.

5.  Pneumonia.

6.  DVT.

7.  History of PE.

8.  Portal vein thrombosis.

9.  Prostate cancer, status post prostatectomy.

10.  Anxiety.

11.  Pulmonary nodules.

12.  Status post craniotomy with drainage of brain abscess.

13.  Status post appendectomy.

14.  Status post left inguinal hernia repair.

 

MEDICATIONS:

1.  Tylenol.

2.  Amitriptyline.

3.  Ativan as needed.

4.  Magnesium hydroxide.

5.  Methylprednisolone 60 mg IV every 8 hours.

6.  Cefepime 1 g every 12 hours.

7.  Omeprazole.

8.  Oxybutynin.

9.  Rivaroxaban.

 

ALLERGIES:  To ALLOPURINOL, AZITHROMYCIN, ISOSORBIDE.

 

SOCIAL HISTORY:  Lives with his wife in [place].  He had a trip to [place], 
but after the fever and rash developed.  Not around small children before the 
symptoms started either.  No pets.  No farm animal contact.

 

FAMILY HISTORY:  No recurrent infection or tuberculosis.  His grandmother had a 
history of breast cancer.

 

REVIEW OF SYSTEMS:  All negative 14-point review of systems except as noted 
above.

 

PHYSICAL EXAM:  Vital Signs:  Temperature 37, heart rate 100, respiratory rate 
16, blood pressure 140/67, oxygen saturation 100% on room air.  In general, he 
is awake, not in distress.  Neurologic:  He is oriented x3.  Follows all 
commands.  HEENT:  There is no conjunctival hemorrhage.  Oropharynx without 
lesions.  Neck: Supple without mass.  Lymph Nodes:  There is no cervical, 
supraclavicular, inguinal, axillary, or epitrochlear lymphadenopathy.  Heart:  
Regular rate and rhythm without murmurs, rubs or gallops.  Lungs:  Clear to 
auscultation bilaterally.  Abdomen:  Soft, nontender, nondistended.  There are 
bowel sounds present.  Skin:  On the lower legs on the plantar and dorsum of 
the foot there are erythematous macules, some blanching, some nonblanching, not 
painful.  On the palms of the hand there are similar lesions.  On the dorsal 
hand, there are erythematous papules that appear punctate.  There is no 
vesicle.  On both the corners of the mouth, there is a 0.5-cm eschar with mild 
surrounding erythema.

 

DIAGNOSTIC STUDIES/LAB DATA:  White blood cell count 1.9, hemoglobin 8.6, 
platelets 90, , creatinine 0.8, CRP was 100, ALT 20.  Urinalysis 
negative.

 

Please see impressions and recommendations outlined above, which I have 
discussed with Dr. Nevarez.

 

Thank you for asking me to see Mr. Mehta in consultation.

 

 [id number]/[facility] #: [id number]

MTDD

## 2018-06-12 NOTE — CONSULT
Consult


Consult: 





Mr. Mehta is a 73 year old man with a history of leukopenia and a DVT in the 

past in the setting prostate cancer and pulmonary nodules.





His prior workup was notable for a positive P ANCA on March 9, 2018 with 

elevated inflammated inflammatory markers.





He presents with fever, chills and a largely painless blistering rash on his 

extremities and his serologies are notable for leukopenia and progressive 

anemia.





He may have an underlying vasculitis.  Considering that his white count is now 

dropping, consider evolution of an underyling hematologic condition.





Will check autoimmune serologies.





I agree with the need for a dermatology evaluation.  Please also ask Dr. Gonsalves 

to get involved as well.





PLAN) Consider neutropenic precautions.





Consider switching his steroids to Solumedrol 60mg IV every 8 hours.  

Potentially there is a role for Rituximab?  However final diagnosis is still 

elusive.

## 2018-06-12 NOTE — CONS
CONSULTATION REPORT:

 

DATE OF CONSULT:  06/12/18

 

CONSULTING PHYSICIANS:  Dr. Nevarez, Dr. Caldwell.

 

REASON FOR CONSULT:  Evaluate for vasculitis.

 

CHIEF COMPLAINT:  Rash.

 

HISTORY OF PRESENT ILLNESS:  Mr. Mehta is a 73-year-old male, known to me, who 
has a past medical history of MRSA pneumonia with also hypocoagulable condition 
including DVT, pulmonary embolism, portal vein thrombosis, as well as prostate 
cancer and pulmonary nodule and macrocytic anemia.  He had an inflammatory 
condition characterized by very high inflammatory markers and I have been 
seeing him since December 2017 as he also had leukopenia of unclear etiology in 
the setting of this hypocoagulable condition.  His workup at that time revealed 
very high inflammatory markers and was felt that he might have an underlying 
connective tissue disorder, although his workup was only serologically notable 
for intermittently positive ANCA, specifically he had a positive p-ANCA in 
March of this year.  There was a concern that he might have some sort of 
nephrotic picture.  He was also referred to Nephrology who did see him and felt 
that a renal biopsy would be too risky given his underlying blood thinners.  He 
has also been following up with Dr. Gonsalves.  Per my initial discussion with Dr. Gonsalves, earlier this year he had been placed on prednisone 60 mg daily and he 
noted a marked symptomatic improvement with prednisone.  This had been tapered 
as he was overall feeling well and he was developing complications of 
hypoglycemia.  Indeed, his sedimentation rate and inflammatory markers did 
improve on this regimen.  He had been on 30 mg of prednisone.  However, he was 
admitted with a fever as well as a rash of unclear etiology and the rash was on 
his hand, soles, and back of neck and arms.  As noted above, he has also seen 
Infectious Disease, Dr. Jackson, as well as Pulmonology.

 

He had, as noted above, decreased himself down to 30 mg daily about 2 weeks ago
; however, about 1 week ago on Monday, he developed a fever of 101.  He was 
seen by his primary care provider and started on Cipro.  He had been feeling 
relatively well over the last week but then on the morning of admission 
developed a fever of 101.5.  He tried Motrin and the fever improved, but he 
continued to have chills as well as nightly fever.  He also developed rash as 
noted above.  He denied any chest pain, cough, shortness of breath, nausea or 
vomiting, or diarrhea or abdominal pain.  He has had no recent tick bites and 
his infectious disease workup in the past has been negative.  He has reported a 
rash for about a week, this is a blister like or pemphigoid like rash on his 
lip region but also his forearms and has been slightly raised, but it is not 
pruritic or painful.  He has also had some bullous- type lesions on his upper 
neck region that appeared to be fluid filled.  He also had hive-like reaction 
over his ears, which has improved.  He had some small demarcated pigmented 
areas on his toes as well as his hands and feet.  They are dark in nature.  He 
has had no other rashes except for those noted above.  He has had no sores in 
his mouth, although he has had some mucositis-type lesions on his lips.  He 
came to the emergency room because of these symptoms.

 

While in the ER, he was noted to have an initial white count of 7.9, but this 
has decreased to less than 2 today.  He is also anemic with a hemoglobin of 8.8 
and his CRP was 100.61.  His basic metabolic panel was unremarkable.  He had a 
negative urinalysis, which has most recently been negative.  Chest x-ray showed 
no acute disease and he currently feels well except that he does have a 
persistent rash.

 

PAST MEDICAL HISTORY:  Includes,

 

1.  Hypertension.

2.  BPH.

3.  MRSA.

4.  Pneumonia with subsequent complications of MRSA with brain abscess.

5.  History of DVT.

6.  History of pulmonary embolism.

7.  Portal vein thrombosis.

8.  Prostate cancer.

9.  History of anxiety.

10.  History of pulmonary nodules, followed by Pulmonology.

11.  Macrocytic anemia.

 

PAST SURGICAL HISTORY:  Includes,

 

1.  Status post left inguinal hernia repair.

2.  Status post appendectomy.

3.  Status post prostatectomy.

4.  Status post craniotomy with I and D and abscess.

 

HOME MEDICATIONS:  Include,

 

1.  Prednisone 30 mg daily.

2.  Xarelto 20 mg daily.

3.  Ditropan 10 mg daily.

4.  Prilosec 20 mg daily.

5.  Milk of magnesia.

6.  Lorazepam 0.5 mg every 6 hours as needed.

7.  Ibuprofen as needed.

8.  Colace as needed.

9.  Cipro twice daily.

10.  Calcium with vitamin D.

11.  Symbicort.

12.  Amitriptyline.

 

ALLERGIES:  Include ALLOPURINOL, AZITHROMYCIN, and SSRIs.

 

FAMILY HISTORY:  Notable for his father with Parkinson's disease and kidney 
stones, mother with hypertension.  His mother lives, 97.  Step-sister has 
hypertension and trouble with her hearing.

 

SOCIAL HISTORY:  He is , lives with his wife.  He has a daughter.  He is 
retired.  He has never smoked, never smoked cigarettes.  He only rarely drinks 
alcohol.  Denies any other drug use.  He does exercise sporadically but not 
recently.  He has consumed coffee.

 

REVIEW OF SYSTEMS:  Constitutionally denies dehydration symptoms and no recent 
weight loss.  HEENT:  He has had some lip lesions but denies jaw pain or 
difficulty swallowing.  Cardiovascular:  Denies chest pain, shortness of breath
, or irregular heart rate.  Respiratory:  Denies cough or congestion or night 
sweats.  Prednisone seemed to cause some insomnia-type symptoms despite 
increasing his amitriptyline. GI:  Denies abdominal pain, nausea, or vomiting.  
Musculoskeletal:  Denies joint pain or swelling.  He has had no spasms, no 
trouble walking, and no significant back pain.  Skin:  He has had a rash as 
noted above but denies any nail changes or Raynaud.  Neurologic:  No numbness 
or tingling.  No lightheadedness.  Psychiatric: With some anxiety with increase 
in prednisone, but this has improved as he has decreased it.  Other 14-point 
review of systems were reviewed and otherwise negative.

 

PHYSICAL EXAM:  He is a pleasant 73-year-old male in no acute distress, sitting 
up. Able to converse.  On physical exam, his temperature is 98.5 yesterday with 
a heart rate of 91, respiratory rate of 15, O2 sats 99% on room air.  Blood 
pressure was initially 117/69.  In general, he has no acute distress.  HEENT 
Exam: Normocephalic, atraumatic.  Pupils equal, round, and reactive to light 
and accommodate.  Extraocular movements were intact.  Lip had mucositis-type 
crusting around the edges, but he had no mucositis in his mouth and no oral 
ulcers.  Neck was supple.  Lymph:  No adenopathy.  No thyromegaly.  Respiratory
:  No accessory muscle use.  Bones are clear to auscultation laterally.  
Cardiovascular Exam: Revealed a regular rate and rhythm, normal S1 and S2.  No 
murmurs, rubs, or gallops.  Abdomen:  Soft, nontender, nondistended, with 
positive bowel sounds with no organomegaly.  Extremities:  No edema.  Dorsalis 
pedis and posterior tibial pulses were 2+ and symmetric.  Musculoskeletal:  
There is no clubbing or cyanosis noted and he exhibited good strength in all 4 
extremities.  Neurologic:  He is alert and oriented x4.  Cranial nerves II 
through XII are intact.  Psychiatric:  He is pleasant.  No acute distress.  Skin
:  He had  slightly bullous lesions on his forearms, slightly raised in 
appearance, which were nonpruritic and nonconfluent. There was an exanthem 
across the upper neck region, which was very mild and slightly faded.  There  
are no ischemic lesions.  There are no definitive hives.

 

DIAGNOSTIC STUDIES/LAB DATA:  He had a sodium of 123, potassium of 3.9, 
chloride of 96, CO2 of 32, BUN 18, creatinine 0.99, glucose of 124.  White 
count came down to 2.7 and then came down lower with a hemoglobin of 8.8.  As 
an outpatient, he had an IgG that was 2820 with an IgG1 that was elevated at 
2160.  He had an IgA that was most recently normal.  In terms of his prior 
evaluation rheumatologically, his C- reactive protein was, in May, 15.90, so it 
has substantially gone up since then and his most recent IgG was normal.  His 
ANCA was negative in May, although he had been positive for p-ANCA prior to 
that.  He did have a sed rate of 62 in May of this year.  He did have a plan to 
get a CT scan of the chest in March more specifically; however, he had a CT 
scan of the chest in January looking for neoplastic lesions. He had multiple 
pulmonary parenchymal masses with additional pulmonary parenchymal lesions, 
which are stable, with a stable low-density hepatic lesion, most consistent 
with a cyst.  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 
collaterals with the spleen at the upper range of normal and size unchanged.  
He did have FNA of a right neck lymph node enlargement, which was reviewed by 
Dr. Gonsalves and felt to be benign.  He had an echo on March 8th of this year, 
showing slightly decreased left ventricular chamber size with an ejection 
fraction of 55% to 60% with no evidence of diastolic dysfunction, but he did 
have trace mitral regurg, trace tricuspid regurg, trace-to-mild pulmonic regurg 
with mild dilatation of the aortic root.  He did have negative and extractable 
nuclear imaging panel in January.  His scleroderma antibody was negative.  His 
double- stranded DNA and ACE level were normal in January of this year.  His 
B12 was 425, but he did have a phospholipid IGM on 17.3, which was elevated, 
and an IgG that was less than 9.4.  His complements were normal and his BRANDT was 
less than 1:80 and this was in January of this year.  He had a liver 
parenchymal biopsy and this was in December 2016, which showed mild 
macrovascular steatosis and reactive cytologic features.

 

ASSESSMENT:  Mr. Mehta is a 73-year-old male with a history of marked elevated 
inflammatory markers, progressive leukopenia, and more immediately and recently 
with a few rash primarily affecting the extremities with a raised bullous-type 
appearance versus urticaria.  Symptomatically, he had been noticing a 
significant improvement in terms of fatigue and other constitutional symptoms 
when prednisone was introduced earlier this year.  This was tapered and indeed 
it was self-tapered to 30 mg recently.  He has had some return of inflammatory 
symptoms with a markedly elevated CRP and a rash.  My concern is also that he 
also has progressive leukopenia and is now becoming neutropenic.  I have spoken 
to the nursing staff and recommended neutropenia precautions and also discussed 
his case with Dr. Nevarez. He certainly could have an underlying vasculitis that 
might explain his symptoms. It is atypical for a medium or small vessel 
vasculitis to present with such profound leukopenia and he has not been on any 
kind of immunomodulating or immunosuppressive medications other than prednisone 
that could explain this progressive leukopenia.  So, I did think it would be 
good to get Dr. Gonsalves involved again, his hematologist.  I would also recommend 
that he have a dermatology evaluation, perhaps he could have a skin biopsy, and 
histologically that may be helpful.

 

I agree with rechecking the BRANDT and also complements and double-stranded DNA as 
well as an ANCA titer now that he is on a low dose of prednisone and the CRP is 
going up.  I am wondering if he does have an underlying vasculitis if he might 
be a candidate for rituximab; however, we have not histologically confirmed the 
vasculitis and apparently was felt by Nephrology that given this blood thinner 
use that a renal biopsy was too dangerous.  We would also follow his urinalysis
, however, to look for an active urinary sediment and I would also suggest 
given that he is progressively leukopenic and that he has responded to steroids
, even though we do not have a definitive diagnosis to increase his steroids to 
see if this helps his white count improve, but he is at significant risk for 
complications for neutropenic fever, which may potentially occur.  I therefore 
consider Solu-Medrol 60 mg every 8 hours and a proton pump inhibitor for GI 
protection.  I will continue to follow daily.



He also has a slightly elevated anticardiolipin antibody in the setting of 
several prior clots; however it was not significantly elevated.



I am also still wondering if he could have a paraneoplastic syndrome. 

 

Extensive amount of time was spent counselling and reviewing the patient's chart
, labs data, as well as his history and examine, coordinating care with Dr. Nevarez.

 

 [id number] #: [id number]

DEYA

## 2018-06-13 LAB
BASOPHILS # BLD AUTO: 0 10^3/UL (ref 0–0.2)
EOSINOPHIL # BLD AUTO: 0 10^3/UL (ref 0–0.6)
HCT VFR BLD AUTO: 24 % (ref 42–52)
HCT VFR BLD AUTO: 24 % (ref 42–52)
HGB BLD-MCNC: 8.1 G/DL (ref 14–18)
LYMPHOCYTES # BLD AUTO: 0.4 10^3/UL (ref 1–4.8)
MCH RBC QN AUTO: 37 PG (ref 27–31)
MCHC RBC AUTO-ENTMCNC: 35 G/DL (ref 31–36)
MCV RBC AUTO: 105 FL (ref 80–94)
MONOCYTES # BLD AUTO: 0.2 10^3/UL (ref 0–0.8)
NEUTROPHILS # BLD AUTO: 0.5 10^3/UL (ref 1.5–7.7)
NRBC # BLD AUTO: 0 10^3/UL
NRBC BLD QL AUTO: 0.1
PLATELET # BLD AUTO: 78 10^3/UL (ref 150–450)
RBC # BLD AUTO: 2.23 10^6/UL (ref 4.6–6.2)
RBC # BLD AUTO: 2.23 10^6/UL (ref 4–5.4)
RETICULOCYTE PRODUCTION INDEX: 0.4 %
RETICULOCYTE PRODUCTION INDEX: 0.7 % (ref 0.5–1.5)
WBC # BLD AUTO: 1.1 10^3/UL (ref 3.5–10.8)

## 2018-06-13 RX ADMIN — RIVAROXABAN SCH MG: 20 TABLET, FILM COATED ORAL at 08:43

## 2018-06-13 RX ADMIN — OMEPRAZOLE SCH MG: 20 CAPSULE, DELAYED RELEASE ORAL at 08:43

## 2018-06-13 RX ADMIN — MOMETASONE FUROATE AND FORMOTEROL FUMARATE DIHYDRATE SCH PUFF: 100; 5 AEROSOL RESPIRATORY (INHALATION) at 19:22

## 2018-06-13 RX ADMIN — METHYLPREDNISOLONE SODIUM SUCCINATE SCH MG: 125 INJECTION, POWDER, FOR SOLUTION INTRAMUSCULAR; INTRAVENOUS at 08:42

## 2018-06-13 RX ADMIN — MOMETASONE FUROATE AND FORMOTEROL FUMARATE DIHYDRATE SCH PUFF: 100; 5 AEROSOL RESPIRATORY (INHALATION) at 07:50

## 2018-06-13 RX ADMIN — METHYLPREDNISOLONE SODIUM SUCCINATE SCH MG: 125 INJECTION, POWDER, FOR SOLUTION INTRAMUSCULAR; INTRAVENOUS at 00:09

## 2018-06-13 RX ADMIN — AMITRIPTYLINE HYDROCHLORIDE SCH MG: 10 TABLET, FILM COATED ORAL at 20:56

## 2018-06-13 RX ADMIN — METHYLPREDNISOLONE SODIUM SUCCINATE SCH MG: 125 INJECTION, POWDER, FOR SOLUTION INTRAMUSCULAR; INTRAVENOUS at 18:19

## 2018-06-13 RX ADMIN — OXYBUTYNIN CHLORIDE SCH MG: 5 TABLET, EXTENDED RELEASE ORAL at 08:43

## 2018-06-13 RX ADMIN — SODIUM CHLORIDE SCH MLS/HR: 900 IRRIGANT IRRIGATION at 08:31

## 2018-06-13 RX ADMIN — MAGNESIUM HYDROXIDE PRN ML: 400 SUSPENSION ORAL at 20:56

## 2018-06-13 RX ADMIN — DOCUSATE SODIUM PRN MG: 100 CAPSULE, LIQUID FILLED ORAL at 20:56

## 2018-06-13 RX ADMIN — LORAZEPAM PRN MG: 0.5 TABLET ORAL at 00:08

## 2018-06-13 NOTE — PN
Subjective





- Subjective


Date of Service: 06/13/18 - He is noting an improvement in his rash and overall 

symptomatology since receiving IV steroids


History: 





Chief Complaint: vasculitic rash


Active Problems: 


 Active Problems





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 

positive 03/18 but MPO and PR3 negative) vs other. - Also has h/o DVT/PE, 

portal vein thrombosis with positive anticardiolipine IgM in 12/17, suggestive 

of antiphospholipid syndrome. - Follow autoimmune tests. 


DVT prophylaxis (Acute) [id number]


 - Xarelto. 


Full code status (Acute) Z78.9


Neutropenic fever (Acute) D70.9, R50.81


 - Unclear if this is infectious (patient immunnosuppressed on high dose 

steroids) or related to auto-immune disorder. - Hematology, ID, and Derm 

consults appreciated. - Antibiotics discontinued at this time. - Derm suspects 

Sweet syndrome and he has had significant improvement with steroids. 


Rash (Acute) R21


 - Autoimmune vs viral (less likely). Derm suspects Sweet syndrome. - Follow up 

biopsy. 








Current Medications: 


 Current Medications





Acetaminophen (Tylenol Tab*)  650 mg PO Q4H PRN


   PRN Reason: FEVER/PAIN


Amitriptyline HCl (Elavil Tab*)  20 mg PO BEDTIME [facility]


   Last Admin: 06/12/18 20:33 Dose:  20 mg


Docusate Sodium (Colace Cap*)  100 mg PO DAILY PRN


   PRN Reason: CONSTIPATION


   Last Admin: 06/12/18 12:51 Dose:  100 mg


Filgrastim-Sndz (Zarxio*)  480 mcg SUBCUT DAILY [facility]


   Last Admin: 06/13/18 13:21 Dose:  480 mcg


Sodium Chloride (Ns 0.9% 1000 Ml*)  1,000 mls @ 75 mls/hr IV PER RATE [facility]


   Last Admin: 06/13/18 08:31 Dose:  75 mls/hr


Lorazepam (Ativan Tab(*))  0.5 mg PO Q6H PRN


   PRN Reason: ANXIETY


   Last Admin: 06/13/18 00:08 Dose:  0.5 mg


Magnesium Hydroxide (Milk Of Magnesia Liq*)  30 ml PO DAILY PRN


   PRN Reason: CONSTIPATION


   Last Admin: 06/12/18 12:51 Dose:  30 ml


Methylprednisolone Sodium Succinate (Solu-Medrol 125mg *)  60 mg IV Q8H [facility]


   Last Admin: 06/13/18 08:42 Dose:  60 mg


Mometasone Furoate/Formoterol Fumar (Dulera 100/5 Mdi*)  2 puff INH BID [facility]


   Last Admin: 06/13/18 07:50 Dose:  2 puff


Omeprazole (Prilosec Cap*)  20 mg PO DAILY [facility]


   Last Admin: 06/13/18 08:43 Dose:  20 mg


Oxybutynin Chloride (Ditropan Xl Tab*)  10 mg PO DAILY [facility]


   Last Admin: 06/13/18 08:43 Dose:  10 mg


Propranolol HCl (Inderal La Cap*)  80 mg PO DAILY PRN


   PRN Reason: Anxiety


   Last Admin: 06/12/18 17:54 Dose:  80 mg


Rivaroxaban (Xarelto(*))  20 mg PO DAILY [facility]


   Last Admin: 06/13/18 08:43 Dose:  20 mg











- Review of Systems


Constitutional Symptoms: No: Weight Gain, Weakness, Fatigue, Fever, Night Sweats

, Unexplained Falls


Dermatology: Rash: Yes, Skin Lesions: Yes, Change in Skin Lesion: Yes - 

Improvement in exanthem


HEENT: Yes Normal


Eyes: Positive: Normal


Thyroid: Positive: Normal


Pulmonary: Positive: Normal


Cardiology: Positive: Normal


Gastroenterology: Positive: Normal


Musculoskeletal: Positive: Joint Stiffness


Endocrinology: Positive: Normal


Hematologic/Lymphatic: Positive: Anemia


Neurology: Positive: Normal


Psychiatry: Positive: Normal


Allergic/Immunologic: Positive: Immunocompromise


Home Medications: 


 Home Medications











 Medication  Instructions  Recorded  Confirmed  Type


 


Amitriptyline TAB* [Elavil TAB*] 20 mg PO BEDTIME 06/11/18 06/11/18 History


 


Budesonide/Formote 80/4.5(NF) 2 puff INH BID 06/11/18 06/11/18 History





[Symbicort 80/4.5 (NF)]    


 


Calcium Carbonate/Vitamin D3 1 tab PO DAILY 06/11/18 06/11/18 History





[Calcium 600 + Vit D Tablet]    


 


Ciprofloxacin TAB* [Cipro 500 MG 1 tab PO BID 06/11/18 06/11/18 History





TAB*]    


 


Docusate CAP* [Colace Cap*] 100 mg PO DAILY PRN 06/11/18 06/11/18 History


 


Ibuprofen TAB* [Advil TAB*] 400 mg PO Q6HR PRN 06/11/18 06/11/18 History


 


LORazepam TAB(*) [Ativan 0.5 MG 0.5 mg PO Q6H PRN 06/11/18 06/11/18 History





TAB (*)]    


 


Magnesium Hydroxide LIQ* [Milk of 30 ml PO DAILY PRN 06/11/18 06/11/18 History





Magnesia LIQ*]    


 


Omeprazole CAP* [Prilosec CAP* 20 20 mg PO DAILY 06/11/18 06/11/18 History





MG]    


 


Oxybutynin XL TAB* [Ditropan XL 10 mg PO DAILY 06/11/18 06/11/18 History





TAB*]    


 


Rivaroxaban TAB(*) [Xarelto 20 mg] 20 mg PO DAILY 06/11/18 06/11/18 History


 


predniSONE TAB* [Deltasone 10 MG 30 mg PO DAILY 06/11/18 06/11/18 History





TAB*]    











Allergies: 


 Allergies











Allergy/AdvReac Type Severity Reaction Status Date / Time


 


allopurinol Allergy  Rash Verified 06/11/18 11:47


 


azithromycin [From Zithromax] Allergy  Rash Verified 06/11/18 11:47


 


ssri Allergy Intermediate Hallucinati Uncoded 06/11/18 11:47





   ons  














Objective





- Vital Signs


Vital Signs: 


 Vital Signs











  06/12/18 06/12/18 06/12/18





  19:28 19:51 19:54


 


Temperature 97.4 F  


 


Pulse Rate 93  88


 


Respiratory 18 17 14





Rate   


 


Blood Pressure 135/69  





(mmHg)   


 


O2 Sat by Pulse 100 100 99





Oximetry   














  06/12/18 06/13/18 06/13/18





  23:12 00:08 03:06


 


Temperature 97.6 F  97.6 F


 


Pulse Rate 66  70


 


Respiratory 20 17 16





Rate   


 


Blood Pressure 122/68  134/70





(mmHg)   


 


O2 Sat by Pulse 100  100





Oximetry   














  06/13/18 06/13/18 06/13/18





  03:11 07:29 07:51


 


Temperature  97.5 F 


 


Pulse Rate  66 66


 


Respiratory 15 22 18





Rate   


 


Blood Pressure  137/68 





(mmHg)   


 


O2 Sat by Pulse  100 100





Oximetry   














- Intake and Output


Intake and Output: 


 Intake & Output











 06/11/18 06/12/18 06/13/18 06/14/18





 06:59 06:59 06:59 06:59


 


Intake Total   4649 560


 


Balance   4649 560


 


Intake:    


 


  IV Fluids   2589 


 


    ns   1689 


 


  IVPB   100 


 


    ns   100 


 


  Oral   1960 560


 


Other:    


 


  Estimated Void    Medium


 


  # Bowel Movements   0 


 


  # Voids   0 4





 





ADLs: Meal  Record                                         Start:  06/11/18 20:

11


Freq:   DAILY@0900,1400,1800                               Status: Active      

  


Protocol:                                                                      

  


 Created      06/11/18 20:11  System  (Rec: 06/11/18 20:11  System  MED-C03)


 Document     06/12/18 09:00  [id number]  (Rec: 06/12/18 09:41  [id number]  MED-C09)


 Document     06/12/18 14:00  [id number]  (Rec: 06/12/18 14:59  [id number]  MED-C02)


 Document     06/12/18 18:00  [id number]  (Rec: 06/12/18 18:34  [id number]  MED-C02)


 Document     06/13/18 09:00  [id number]  (Rec: 06/13/18 09:41  [id number]  MED-C09)


 Document     06/13/18 14:00  [id number]  (Rec: 06/13/18 14:09  [id number]  MED-C14)


Intake and Output                                          Start:  06/11/18 20:

11


Freq:   DAILY@0600,1400,2200                               Status: Active      

  


Protocol:                                                                      

  


 Created      06/11/18 20:11  System  (Rec: 06/11/18 20:11  System  MED-C03)


 Document     06/11/18 21:04  [id number]  (Rec: 06/11/18 21:04  [id number]  MED-C14)


 Document     06/12/18 06:00  [id number]  (Rec: 06/12/18 06:20  [id number]  MED-C14)


 Document     06/12/18 14:00  [id number]  (Rec: 06/12/18 14:59  [id number]  MED-C02)


 Document     06/12/18 21:46  [id number]  (Rec: 06/12/18 21:47  [id number]  MED-C02)


 Document     06/13/18 06:00  [id number]  (Rec: 06/13/18 06:18  [id number]  MEDL-C02)


 Document     06/13/18 14:00  [id number]  (Rec: 06/13/18 14:09  [id number]  MED-C14)











- Physical Exam


General Physical Exam Comment: No distress noted.  He is ambulating without 

difficulty


Eye Exam: bilateral: PERRLA


Skin: Abnormal: Rash - Improvement overall but there is mild dorsal hand 

erythema and mild purpuric non confluent macular lesions on his legs; overall 

improved


Sinuses: Non-tender Maxillary Sinus


Throat/Oropharyx Exam: Bilateral: Normal


Thyroid Function: Clinically Euthyroid


Lungs and Chest: Yes: Chest Expansion Symetrica, Percussion Note Resonant


Heart Rate and Rhythm: Regular


JVP: Not Elevated


[place] Beat: Non Displaced


Additional Cardiovascular: Yes: [place] Beat not Displaced, Normal Heart Sounds


Abdominal Exam: Yes: Soft





- Rheumotological System


Joints: Range of Motion - normal





- Extremities


Cranial Nerves II-XII Intact: Yes


Limbs: Normal Power





- Neuro


Psychiatric: Normal


Speech: Normal





Results





- Results


Lab Results: 


 Laboratory Results - last 24 hr











  06/13/18 06/13/18 06/13/18





  06:11 06:19 06:19


 


WBC    1.1 L


 


RBC    2.23 L


 


RBC (Retic)    2.23 L D


 


Hgb    8.1 L


 


Hct    24 L


 


HCT (Retic)    24 L


 


MCV    105 H


 


MCH    37 H


 


MCHC    35


 


RDW    16 H


 


Plt Count    78 L


 


MPV    6.7 L


 


Neut % (Auto)    47.2


 


Lymph % (Auto)    34.8


 


Mono % (Auto)    17.4 H


 


Eos % (Auto)    0


 


Baso % (Auto)    0.6


 


Absolute Neuts (auto)    0.5 L*


 


Absolute Lymphs (auto)    0.4 L


 


Absolute Monos (auto)    0.2


 


Absolute Eos (auto)    0


 


Absolute Basos (auto)    0


 


Absolute Nucleated RBC    0


 


Nucleated RBC %    0.1


 


Retic Count, Calc    1.3


 


Corrected Retic Count    0.7


 


Retic Shift Factor    2.0


 


Retic Production Index    0.40


 


Immature Retic Fraction    0.46


 


Mean Retic Volume    122.9


 


Sodium   


 


Potassium   


 


Chloride   


 


Carbon Dioxide   


 


Anion Gap   


 


BUN   


 


Creatinine   


 


Est GFR ( Amer)   


 


Est GFR (Non-Af Amer)   


 


BUN/Creatinine Ratio   


 


Glucose   


 


Calcium   


 


Lactate Dehydrogenase   


 


Cryoglobulin   TNP 


 


Direct Antiglob Test  Negative  














  06/13/18





  06:19


 


WBC 


 


RBC 


 


RBC (Retic) 


 


Hgb 


 


Hct 


 


HCT (Retic) 


 


MCV 


 


MCH 


 


MCHC 


 


RDW 


 


Plt Count 


 


MPV 


 


Neut % (Auto) 


 


Lymph % (Auto) 


 


Mono % (Auto) 


 


Eos % (Auto) 


 


Baso % (Auto) 


 


Absolute Neuts (auto) 


 


Absolute Lymphs (auto) 


 


Absolute Monos (auto) 


 


Absolute Eos (auto) 


 


Absolute Basos (auto) 


 


Absolute Nucleated RBC 


 


Nucleated RBC % 


 


Retic Count, Calc 


 


Corrected Retic Count 


 


Retic Shift Factor 


 


Retic Production Index 


 


Immature Retic Fraction 


 


Mean Retic Volume 


 


Sodium  133 L


 


Potassium  4.4


 


Chloride  100 L


 


Carbon Dioxide  30


 


Anion Gap  3


 


BUN  21


 


Creatinine  0.77


 


Est GFR ( Amer)  127.7


 


Est GFR (Non-Af Amer)  99.3


 


BUN/Creatinine Ratio  27.3 H


 


Glucose  160 H


 


Calcium  8.9


 


Lactate Dehydrogenase  239


 


Cryoglobulin 


 


Direct Antiglob Test 














Assessment





- Problem List


Assessment: 


Patient Problems





Autoimmune disorder (Acute)


DVT prophylaxis (Acute)


Full code status (Acute)


Neutropenic fever (Acute)


Rash (Acute)








Plan: 





Mr. Mehta has refractory leukopenia in the setting of very high inflammatory 

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 

history of a positve ANCA.





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  He is clinically improved 

on steroids, although his white count remains low.





He also has a slightly elevated APL antibody in the setting of multiple clots 

in the past.





His low complement is intriguing for a possible overlap connective tissue 

disorder, although his lupus workup in the past was negative.  We are awaiting 

some serologies and also skin biopsy results.





For now, I recommend continuing Solumedrol; as he clinically improves, we 

should taper back to 1mg/kg/d orally.  We should consider adding Rituximab as a 

steroid sparing biologic.  Consider Rituximab 375 mg per meter squared weekly 

for 4 weeks.  





Will follow; thanks


Coordination of Care: Yes


Discussion with Family/Community Member: Daughter and patient





- Health Maintenance


14 Systems Reviewed as above all others Negative: Yes

## 2018-06-13 NOTE — PN
Progress Note





- Progress Note


Date of Service: 06/13/18


SOAP: 


Subjective:


CC: rash


HPI: 73 year old man on corticosteroids with rash on hands and feet with fever.

  All resolving with higher dose corticosteroids.  Rash fading, not painful or 

itchy, left hand biopsy site not bothering him.  








Objective:








 Vital Signs











Temp  36.4 C   06/13/18 07:29


 


Pulse  66   06/13/18 07:51


 


Resp  18   06/13/18 07:51


 


BP  137/68   06/13/18 07:29


 


Pulse Ox  100   06/13/18 07:51








 Intake & Output











 06/12/18 06/13/18 06/13/18





 18:59 06:59 18:59


 


Intake Total 4219 790 120


 


Balance 4219 790 120


 


Intake:   


 


  IV Fluids 1799 790 


 


    ns 899 790 


 


  IVPB 100  


 


    ns 100  


 


  Oral 2320 0 120


 


Other:   


 


  # Bowel Movements 1 0 


 


  # Voids 2 0 








Gen:awake, no distress


HEENT: no thrush


Heart: regular, no murmur


Lungs:CTA BL


Abd:+BS NTND soft


Skin: erythematous macules on feet and hands


 Laboratory Results - last 24 hr











  06/12/18 06/12/18 06/13/18





  06:18 08:41 06:11


 


WBC   


 


RBC   


 


RBC (Retic)   


 


Hgb   


 


Hct   


 


HCT (Retic)   


 


MCV   


 


MCH   


 


MCHC   


 


RDW   


 


Plt Count   


 


MPV   


 


Neut % (Auto)   


 


Lymph % (Auto)   


 


Mono % (Auto)   


 


Eos % (Auto)   


 


Baso % (Auto)   


 


Absolute Neuts (auto)   


 


Absolute Lymphs (auto)   


 


Absolute Monos (auto)   


 


Absolute Eos (auto)   


 


Absolute Basos (auto)   


 


Absolute Nucleated RBC   


 


Nucleated RBC %   


 


Retic Count, Calc   


 


Corrected Retic Count   


 


Retic Shift Factor   


 


Retic Production Index   


 


Immature Retic Fraction   


 


Mean Retic Volume   


 


Sodium  133 L  


 


Potassium  3.7  


 


Chloride  99 L  


 


Carbon Dioxide  28  


 


Anion Gap  6  


 


BUN  17  


 


Creatinine  0.80  


 


Est GFR ( Amer)  122.2  


 


Est GFR (Non-Af Amer)  95.0  


 


BUN/Creatinine Ratio  21.3 H  


 


Glucose  125 H  


 


Hemoglobin A1c   6.0 H 


 


Calcium  8.4 L  


 


Lactate Dehydrogenase   


 


Vitamin B12  334  


 


Cryoglobulin   


 


Direct Antiglob Test    Negative














  06/13/18 06/13/18 06/13/18





  06:19 06:19 06:19


 


WBC   1.1 L 


 


RBC   2.23 L 


 


RBC (Retic)   2.23 L D 


 


Hgb   8.1 L 


 


Hct   24 L 


 


HCT (Retic)   24 L 


 


MCV   105 H 


 


MCH   37 H 


 


MCHC   35 


 


RDW   16 H 


 


Plt Count   78 L 


 


MPV   6.7 L 


 


Neut % (Auto)   47.2 


 


Lymph % (Auto)   34.8 


 


Mono % (Auto)   17.4 H 


 


Eos % (Auto)   0 


 


Baso % (Auto)   0.6 


 


Absolute Neuts (auto)   0.5 L* 


 


Absolute Lymphs (auto)   0.4 L 


 


Absolute Monos (auto)   0.2 


 


Absolute Eos (auto)   0 


 


Absolute Basos (auto)   0 


 


Absolute Nucleated RBC   0 


 


Nucleated RBC %   0.1 


 


Retic Count, Calc   1.3 


 


Corrected Retic Count   0.7 


 


Retic Shift Factor   2.0 


 


Retic Production Index   0.40 


 


Immature Retic Fraction   0.46 


 


Mean Retic Volume   122.9 


 


Sodium    133 L


 


Potassium    4.4


 


Chloride    100 L


 


Carbon Dioxide    30


 


Anion Gap    3


 


BUN    21


 


Creatinine    0.77


 


Est GFR ( Amer)    127.7


 


Est GFR (Non-Af Amer)    99.3


 


BUN/Creatinine Ratio    27.3 H


 


Glucose    160 H


 


Hemoglobin A1c   


 


Calcium    8.9


 


Lactate Dehydrogenase    239


 


Vitamin B12   


 


Cryoglobulin  TNP  


 


Direct Antiglob Test   











Assessment:


1. fever, rash; improving with corticosteroids; less likely infectious though 

still possible


2. vasculitis on chronic corticosteroids


3. pancytopenia in setting of baseline macrocytic anemia











Plan:


1. continue off of antibiotics; viral serologies and skin biopsy pending


25 minutes floor time >50% face to face in counseling regarding diagnostic 

evaluation

## 2018-06-13 NOTE — PRO
*** AMENDED REPORT NOW INCLUDES COSIGNER DESIGNATION - ESIGNED BEFORE 
ADJUSTMENTS ***



CC:  Dr. Villanueva; Dr. Diamond; Dr. Jackson; Dr. Gonsalves; Dr. Nevarez *

 

PROCEDURE NOTE:

 

DATE OF PROCEDURE:  06/12/18

 

ATTENDING SURGEON:  Dg Dumont MD * (DICTATED BY MARK BALDERRAMA)

 

HISTORY:  This is a 73-year-old male admitted with fever and rash, for whom we 
were asked to perform a skin biopsy.  See separate notes from the hospitalist 
and Dr. Diamond.  The patient's history was reviewed.  The patient was seen 
earlier by Dr. Dumont and an area for biopsy was selected and marked from the 
dorsum of the left hand.

 

PROCEDURE IN DETAIL:  After time-out was performed, the area previously marked 
on the dorsum of the left hand was prepped with ChloraPrep and draped in a 
sterile fashion.  2% plain lidocaine was administered (approximately 1 cc).  An 
ellipse of skin measuring approximately 1 cm x 0.5 cm full thickness was 
excised using a #15 scalpel blade.  Hemostasis was obtained by direct pressure 
and with 2 sutures of interrupted 4-0 Prolene.  The specimen was bisected with 
half being submitted fresh and half in formalin per the pathologist's 
instructions.  In addition, a request had been made to assist with removal of 
the patient's ring on the left ring finger secondary to swelling.  The finger 
was wrapped with a tourniquet, which was left in place for approximately 5 to 
10 minutes.  Attempt was then made to remove the ring but unsuccessfully.  The 
tourniquet was reapplied for approximately 2 to 3 minutes and then the ring was 
able to be removed successfully.  The patient tolerated both procedures well.  
A pressure dressing was applied to the hand and wrapped with Kerlix gauze.  
This may be changed to a smaller sterile gauze dressing later this evening.  
Sutures may be removed in approximately 7 days.

 

____________________________________ MARK BALDERRAMA

 

[id number]/[facility] #: [id number]

[facility]

## 2018-06-13 NOTE — PN
Progress Note





- Progress Note


Date of Service: 06/13/18


SOAP: 


Subjective:


[Skin biopsy completed yesterday, results pending.  Patient reports significant 

improvement in appearance of rash on Solumedrol.  Denies any new symptoms.  No 

new fevers.]








Objective:


[


Vital Signs:











Temp Pulse Resp BP Pulse Ox


 


 97.5 F   66   18   137/68   100 


 


 06/13/18 07:29  06/13/18 07:51  06/13/18 07:51  06/13/18 07:29  06/13/18 07:51

















Acetaminophen (Tylenol Tab*)  650 mg PO Q4H PRN


   PRN Reason: FEVER/PAIN


Amitriptyline HCl (Elavil Tab*)  20 mg PO BEDTIME [facility]


   Last Admin: 06/12/18 20:33 Dose:  20 mg


Docusate Sodium (Colace Cap*)  100 mg PO DAILY PRN


   PRN Reason: CONSTIPATION


   Last Admin: 06/12/18 12:51 Dose:  100 mg


Filgrastim-Sndz (Zarxio*)  480 mcg SUBCUT DAILY [facility]


   Last Admin: 06/13/18 13:21 Dose:  480 mcg


Sodium Chloride (Ns 0.9% 1000 Ml*)  1,000 mls @ 75 mls/hr IV PER RATE [facility]


   Last Admin: 06/13/18 08:31 Dose:  75 mls/hr


Lorazepam (Ativan Tab(*))  0.5 mg PO Q6H PRN


   PRN Reason: ANXIETY


   Last Admin: 06/13/18 00:08 Dose:  0.5 mg


Magnesium Hydroxide (Milk Of Magnesia Liq*)  30 ml PO DAILY PRN


   PRN Reason: CONSTIPATION


   Last Admin: 06/12/18 12:51 Dose:  30 ml


Methylprednisolone Sodium Succinate (Solu-Medrol 125mg *)  60 mg IV Q8H [facility]


   Last Admin: 06/13/18 08:42 Dose:  60 mg


Mometasone Furoate/Formoterol Fumar (Dulera 100/5 Mdi*)  2 puff INH BID [facility]


   Last Admin: 06/13/18 07:50 Dose:  2 puff


Omeprazole (Prilosec Cap*)  20 mg PO DAILY [facility]


   Last Admin: 06/13/18 08:43 Dose:  20 mg


Oxybutynin Chloride (Ditropan Xl Tab*)  10 mg PO DAILY [facility]


   Last Admin: 06/13/18 08:43 Dose:  10 mg


Propranolol HCl (Inderal La Cap*)  80 mg PO DAILY PRN


   PRN Reason: Anxiety


   Last Admin: 06/12/18 17:54 Dose:  80 mg


Rivaroxaban (Xarelto(*))  20 mg PO DAILY [facility]


   Last Admin: 06/13/18 08:43 Dose:  20 mg








 Laboratory Results - last 24 hr











  06/12/18 06/12/18 06/12/18





  08:46 08:46 08:46


 


WBC   


 


RBC   


 


RBC (Retic)   


 


Hgb   


 


Hct   


 


HCT (Retic)   


 


MCV   


 


MCH   


 


MCHC   


 


RDW   


 


Plt Count   


 


MPV   


 


Neut % (Auto)   


 


Lymph % (Auto)   


 


Mono % (Auto)   


 


Eos % (Auto)   


 


Baso % (Auto)   


 


Absolute Neuts (auto)   


 


Absolute Lymphs (auto)   


 


Absolute Monos (auto)   


 


Absolute Eos (auto)   


 


Absolute Basos (auto)   


 


Absolute Nucleated RBC   


 


Nucleated RBC %   


 


Retic Count, Calc   


 


Corrected Retic Count   


 


Retic Shift Factor   


 


Retic Production Index   


 


Immature Retic Fraction   


 


Mean Retic Volume   


 


Sodium   


 


Potassium   


 


Chloride   


 


Carbon Dioxide   


 


Anion Gap   


 


BUN   


 


Creatinine   


 


Est GFR ( Amer)   


 


Est GFR (Non-Af Amer)   


 


BUN/Creatinine Ratio   


 


Glucose   


 


Calcium   


 


Lactate Dehydrogenase   


 


Cryoglobulin   


 


Proteinase 3 (PR3)  < 0.2  


 


Myeloperoxidase Ab  < 0.2  


 


Complement C3  69 L   64 L


 


Complement C4  17   17


 


Tot Complement (CH50)  46  


 


CMV IgG Ab   Negative 


 


CMV IgM Ab   Negative 


 


Direct Antiglob Test   














  06/13/18 06/13/18 06/13/18





  06:11 06:19 06:19


 


WBC    1.1 L


 


RBC    2.23 L


 


RBC (Retic)    2.23 L D


 


Hgb    8.1 L


 


Hct    24 L


 


HCT (Retic)    24 L


 


MCV    105 H


 


MCH    37 H


 


MCHC    35


 


RDW    16 H


 


Plt Count    78 L


 


MPV    6.7 L


 


Neut % (Auto)    47.2


 


Lymph % (Auto)    34.8


 


Mono % (Auto)    17.4 H


 


Eos % (Auto)    0


 


Baso % (Auto)    0.6


 


Absolute Neuts (auto)    0.5 L*


 


Absolute Lymphs (auto)    0.4 L


 


Absolute Monos (auto)    0.2


 


Absolute Eos (auto)    0


 


Absolute Basos (auto)    0


 


Absolute Nucleated RBC    0


 


Nucleated RBC %    0.1


 


Retic Count, Calc    1.3


 


Corrected Retic Count    0.7


 


Retic Shift Factor    2.0


 


Retic Production Index    0.40


 


Immature Retic Fraction    0.46


 


Mean Retic Volume    122.9


 


Sodium   


 


Potassium   


 


Chloride   


 


Carbon Dioxide   


 


Anion Gap   


 


BUN   


 


Creatinine   


 


Est GFR ( Amer)   


 


Est GFR (Non-Af Amer)   


 


BUN/Creatinine Ratio   


 


Glucose   


 


Calcium   


 


Lactate Dehydrogenase   


 


Cryoglobulin   TNP 


 


Proteinase 3 (PR3)   


 


Myeloperoxidase Ab   


 


Complement C3   


 


Complement C4   


 


Tot Complement (CH50)   


 


CMV IgG Ab   


 


CMV IgM Ab   


 


Direct Antiglob Test  Negative  














  06/13/18





  06:19


 


WBC 


 


RBC 


 


RBC (Retic) 


 


Hgb 


 


Hct 


 


HCT (Retic) 


 


MCV 


 


MCH 


 


MCHC 


 


RDW 


 


Plt Count 


 


MPV 


 


Neut % (Auto) 


 


Lymph % (Auto) 


 


Mono % (Auto) 


 


Eos % (Auto) 


 


Baso % (Auto) 


 


Absolute Neuts (auto) 


 


Absolute Lymphs (auto) 


 


Absolute Monos (auto) 


 


Absolute Eos (auto) 


 


Absolute Basos (auto) 


 


Absolute Nucleated RBC 


 


Nucleated RBC % 


 


Retic Count, Calc 


 


Corrected Retic Count 


 


Retic Shift Factor 


 


Retic Production Index 


 


Immature Retic Fraction 


 


Mean Retic Volume 


 


Sodium  133 L


 


Potassium  4.4


 


Chloride  100 L


 


Carbon Dioxide  30


 


Anion Gap  3


 


BUN  21


 


Creatinine  0.77


 


Est GFR ( Amer)  127.7


 


Est GFR (Non-Af Amer)  99.3


 


BUN/Creatinine Ratio  27.3 H


 


Glucose  160 H


 


Calcium  8.9


 


Lactate Dehydrogenase  239


 


Cryoglobulin 


 


Proteinase 3 (PR3) 


 


Myeloperoxidase Ab 


 


Complement C3 


 


Complement C4 


 


Tot Complement (CH50) 


 


CMV IgG Ab 


 


CMV IgM Ab 


 


Direct Antiglob Test 








Exam:


Gen: Pleasant and well appearing 72 yo male in NAD


HEENT: MMM


CV: RRR, no m/r/g


Resp: lungs CTA, no w/c/r


Abd: soft, nonTTP


Ext: no edema


Skin: Few areas of faint hyperpigmentation of hands and feet, otherwise rash 

resolved.  Sutures over L dorsal hand]








Assessment:


[72 yo male with pancytopenia and primary autoimmune condition admitted with a 

rash and fever, now responding to corticosteriods.]








Plan:


[1. Pancytopenia


- prior extensive w/u including bone marrow biopsy which was neg


- likely due to bone marrow suppression from autoimmune process


- start Neupogen 480 SQ daily


- transfuse for Hgb <8 or if new symptoms develop


- LDH and direct Oralia neg this admission


- hold anticoagulation for plts <50K


2. Rash


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


3. H/o DVT/PE


- cont eliquis, hold for plts <50K]

## 2018-06-13 NOTE — PN
Subjective


Date of Service: 06/13/18


Interval History: 


HOSPITALIST PROGRESS NOTE


Patient seen and examined at bedside. Care reviewed and d/w Negra Gurrola RN.


He feels much better today. Rahs is much improved, remains afebrile.


Family History: Unchanged from Admission


Social History: Unchanged from Admission


Past Medical History: Unchanged from Admission





Objective


Active Medications: 








Acetaminophen (Tylenol Tab*)  650 mg PO Q4H PRN


   PRN Reason: FEVER/PAIN


Amitriptyline HCl (Elavil Tab*)  20 mg [facility]TIME [facility]


   Last Admin: 06/12/18 20:33 Dose:  20 mg


Docusate Sodium (Colace Cap*)  100 mg PO DAILY PRN


   PRN Reason: CONSTIPATION


   Last Admin: 06/12/18 12:51 Dose:  100 mg


Filgrastim-Sndz (Zarxio*)  480 mcg SUBCUT DAILY [facility]


Sodium Chloride (Ns 0.9% 1000 Ml*)  1,000 mls @ 75 mls/hr IV PER RATE [facility]


   Last Admin: 06/13/18 08:31 Dose:  75 mls/hr


Lorazepam (Ativan Tab(*))  0.5 mg PO Q6H PRN


   PRN Reason: ANXIETY


   Last Admin: 06/13/18 00:08 Dose:  0.5 mg


Magnesium Hydroxide (Milk Of Magnesia Liq*)  30 ml PO DAILY PRN


   PRN Reason: CONSTIPATION


   Last Admin: 06/12/18 12:51 Dose:  30 ml


Methylprednisolone Sodium Succinate (Solu-Medrol 125mg *)  60 mg IV Q8H [facility]


   Last Admin: 06/13/18 08:42 Dose:  60 mg


Mometasone Furoate/Formoterol Fumar (Dulera 100/5 Mdi*)  2 puff INH BID [facility]


   Last Admin: 06/13/18 07:50 Dose:  2 puff


Omeprazole (Prilosec Cap*)  20 mg PO DAILY [facility]


   Last Admin: 06/13/18 08:43 Dose:  20 mg


Oxybutynin Chloride (Ditropan Xl Tab*)  10 mg PO DAILY [facility]


   Last Admin: 06/13/18 08:43 Dose:  10 mg


Propranolol HCl (Inderal La Cap*)  80 mg PO DAILY PRN


   PRN Reason: Anxiety


   Last Admin: 06/12/18 17:54 Dose:  80 mg


Rivaroxaban (Xarelto(*))  20 mg PO DAILY [facility]


   Last Admin: 06/13/18 08:43 Dose:  20 mg








 Vital Signs - 8 hr











  06/13/18 06/13/18 06/13/18





  03:06 03:11 07:29


 


Temperature 97.6 F  97.5 F


 


Pulse Rate 70  66


 


Respiratory 16 15 22





Rate   


 


Blood Pressure 134/70  137/68





(mmHg)   


 


O2 Sat by Pulse 100  100





Oximetry   








Oxygen Devices in Use Now: None


Appearance: Pleasant gentleman sitting up in a chair in NAD.


Eyes: No Scleral Icterus


Ears/Nose/Mouth/Throat: Mucous Membranes Moist


Neck: Trachea Midline


Respiratory: Symmetrical Chest Expansion and Respiratory Effort, Clear to 

Auscultation


Cardiovascular: RRR - Normal S1 and S2


Skin: - - Rash is much improved especially on this hands


Neurological: Alert and Oriented x 3


Result Diagrams: 


 06/13/18 06:19





 06/13/18 06:19





Assess/Plan/Problems-Billing


Assessment: 


Mr. Mehta is a 74yo F with PMH of HTN, BPH, MRSA pneumonia with subsequent brain 

abscess, PE/DVT and portal vein thrombosis, prostate CA, pulmonary nodules, who 

presented to ED with fever and rash.





- Patient Problems


(1) Neutropenic fever


Comment: - Unclear if this is infectious (patient immunnosuppressed on high 

dose steroids) or related to auto-immune disorder.


- Hematology, ID, and Derm consults appreciated.


- Antibiotics discontinued at this time.


- Derm suspects Sweet syndrome and he has had significant improvement with 

steroids.   





(2) Autoimmune disorder


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 

(p ANCA positive 03/18 but MPO and PR3 negative) vs other.


- Also has h/o DVT/PE, portal vein thrombosis with positive anticardiolipine 

IgM in 12/17, suggestive of antiphospholipid syndrome.


- Follow autoimmune tests.   





(3) Rash


Comment: - Autoimmune vs viral (less likely). Derm suspects Sweet syndrome.


- Follow up biopsy.   





(4) DVT prophylaxis


Comment: - Xarelto.   





(5) Full code status





Status and Disposition: 


Inpatient.

## 2018-06-14 LAB
BASOPHILS # BLD AUTO: 0 10^3/UL (ref 0–0.2)
EOSINOPHIL # BLD AUTO: 0 10^3/UL (ref 0–0.6)
HCT VFR BLD AUTO: 25 % (ref 42–52)
HGB BLD-MCNC: 8.4 G/DL (ref 14–18)
LYMPHOCYTES # BLD AUTO: 0.7 10^3/UL (ref 1–4.8)
MCH RBC QN AUTO: 35 PG (ref 27–31)
MCHC RBC AUTO-ENTMCNC: 33 G/DL (ref 31–36)
MCV RBC AUTO: 106 FL (ref 80–94)
MONOCYTES # BLD AUTO: 0.5 10^3/UL (ref 0–0.8)
NEUTROPHILS # BLD AUTO: 32.8 10^3/UL (ref 1.5–7.7)
NRBC # BLD AUTO: 0 10^3/UL
NRBC BLD QL AUTO: 0
PLATELET # BLD AUTO: 120 10^3/UL (ref 150–450)
RBC # BLD AUTO: 2.4 10^6/UL (ref 4–5.4)
WBC # BLD AUTO: 34.1 10^3/UL (ref 3.5–10.8)

## 2018-06-14 RX ADMIN — METHYLPREDNISOLONE SODIUM SUCCINATE SCH MG: 125 INJECTION, POWDER, FOR SOLUTION INTRAMUSCULAR; INTRAVENOUS at 16:44

## 2018-06-14 RX ADMIN — RIVAROXABAN SCH MG: 20 TABLET, FILM COATED ORAL at 09:27

## 2018-06-14 RX ADMIN — DOCUSATE SODIUM PRN MG: 100 CAPSULE, LIQUID FILLED ORAL at 20:37

## 2018-06-14 RX ADMIN — OXYBUTYNIN CHLORIDE SCH MG: 5 TABLET, EXTENDED RELEASE ORAL at 09:27

## 2018-06-14 RX ADMIN — MAGNESIUM HYDROXIDE PRN ML: 400 SUSPENSION ORAL at 20:37

## 2018-06-14 RX ADMIN — MOMETASONE FUROATE AND FORMOTEROL FUMARATE DIHYDRATE SCH PUFF: 100; 5 AEROSOL RESPIRATORY (INHALATION) at 08:07

## 2018-06-14 RX ADMIN — METHYLPREDNISOLONE SODIUM SUCCINATE SCH MG: 125 INJECTION, POWDER, FOR SOLUTION INTRAMUSCULAR; INTRAVENOUS at 00:46

## 2018-06-14 RX ADMIN — OMEPRAZOLE SCH MG: 20 CAPSULE, DELAYED RELEASE ORAL at 09:27

## 2018-06-14 RX ADMIN — METHYLPREDNISOLONE SODIUM SUCCINATE SCH MG: 125 INJECTION, POWDER, FOR SOLUTION INTRAMUSCULAR; INTRAVENOUS at 09:27

## 2018-06-14 RX ADMIN — MOMETASONE FUROATE AND FORMOTEROL FUMARATE DIHYDRATE SCH PUFF: 100; 5 AEROSOL RESPIRATORY (INHALATION) at 20:18

## 2018-06-14 RX ADMIN — AMITRIPTYLINE HYDROCHLORIDE SCH MG: 10 TABLET, FILM COATED ORAL at 20:37

## 2018-06-14 NOTE — PN
Subjective


Date of Service: 06/14/18


Interval History: 


HOSPITALIST PROGRESS NOTE


Patient seen and examined at bedside. Care reviewed and d/w Negra Gurrola RN.


He feels well today, rash is much improved, almost resolved. Offers no other 

complaints.


Family History: Unchanged from Admission


Social History: Unchanged from Admission


Past Medical History: Unchanged from Admission





Objective


Active Medications: 








Acetaminophen (Tylenol Tab*)  650 mg PO Q4H PRN


   PRN Reason: FEVER/PAIN


Amitriptyline HCl (Elavil Tab*)  20 mg PO BEDTIME [facility]


   Last Admin: 06/13/18 20:56 Dose:  20 mg


Docusate Sodium (Colace Cap*)  100 mg PO DAILY PRN


   PRN Reason: CONSTIPATION


   Last Admin: 06/13/18 20:56 Dose:  100 mg


Lorazepam (Ativan Tab(*))  0.5 mg PO Q6H PRN


   PRN Reason: ANXIETY


   Last Admin: 06/13/18 00:08 Dose:  0.5 mg


Magnesium Hydroxide (Milk Of Magnesia Liq*)  30 ml PO DAILY PRN


   PRN Reason: CONSTIPATION


   Last Admin: 06/13/18 20:56 Dose:  30 ml


Methylprednisolone Sodium Succinate (Solu-Medrol 125mg *)  60 mg IV Q8H [facility]


   Last Admin: 06/14/18 09:27 Dose:  60 mg


Mometasone Furoate/Formoterol Fumar (Dulera 100/5 Mdi*)  2 puff INH BID [facility]


   Last Admin: 06/14/18 08:07 Dose:  2 puff


Omeprazole (Prilosec Cap*)  20 mg PO DAILY [facility]


   Last Admin: 06/14/18 09:27 Dose:  20 mg


Oxybutynin Chloride (Ditropan Xl Tab*)  10 mg PO DAILY [facility]


   Last Admin: 06/14/18 09:27 Dose:  10 mg


Propranolol HCl (Inderal La Cap*)  80 mg PO DAILY PRN


   PRN Reason: Anxiety


   Last Admin: 06/12/18 17:54 Dose:  80 mg


Rivaroxaban (Xarelto(*))  20 mg PO DAILY [facility]


   Last Admin: 06/14/18 09:27 Dose:  20 mg











 Selected Entries











  06/13/18





  23:25


 


Temperature 98.5 F


 


Pulse Rate 86


 


Respiratory 17





Rate 


 


Blood Pressure 124/59





(mmHg) 


 


O2 Sat by Pulse 100





Oximetry 











Oxygen Devices in Use Now: None


Appearance: Pleasant gentleman sitting up in a chair in NAD.


Eyes: No Scleral Icterus


Ears/Nose/Mouth/Throat: Mucous Membranes Moist


Neck: Trachea Midline


Skin: - - Faint erythematous rash on hands and palms, right eyelid, much 

improved


Neurological: Alert and Oriented x 3, NL Muscle Strength and Tone


Result Diagrams: 


 06/14/18 08:03





 06/13/18 06:19





Assess/Plan/Problems-Billing


Assessment: 


Mr. Mehta is a 72yo F with PMH of HTN, BPH, MRSA pneumonia with subsequent brain 

abscess, PE/DVT and portal vein thrombosis, prostate CA, pulmonary nodules, who 

presented to ED with fever and rash.





- Patient Problems


(1) Neutropenia


Comment: - Resolved with Neupogen.


- D/w Heme - impression is his pancytopenia is secondary to a rheumatological 

process.


   





(2) Autoimmune disorder


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 

(p ANCA positive 03/18 but MPO and PR3 negative) vs other.


- Also has h/o DVT/PE, portal vein thrombosis with positive anticardiolipine 

IgM in 12/17, suggestive of antiphospholipid syndrome.


- Autoimmune tests negative so far, except for low C3.


- D/w Dr. Diamond - impression is this is ANCA related vasculitis. Will wait 

skin biopsy result, but plan for Rituxan infusion this admission as steroid 

sparer.   





(3) Rash


Comment: - Autoimmune vs viral (less likely). Derm suspects Sweet syndrome.


- Follow up biopsy.   





(4) DVT prophylaxis


Comment: - Xarelto.   





(5) Full code status





Status and Disposition: 


Inpatient.

## 2018-06-14 NOTE — PN
Subjective





- Subjective


Date of Service: 06/14/18 - Chief complaint : vasculitis


History: 





Overall Mr. Mehta feels well today; he has no pain and vasculitic lesions are 

nearly resolved.





I discussed his care today extensively with him and his wife and Dr. Gonsalves and 

Dr. Finch


Active Problems: 


 Active Problems





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 

positive 03/18 but MPO and PR3 negative) vs other. - Also has h/o DVT/PE, 

portal vein thrombosis with positive anticardiolipine IgM in 12/17, suggestive 

of antiphospholipid syndrome. - Autoimmune tests negative so far, except for 

low C3. - D/w Dr. Diamond - impression is this is ANCA related vasculitis. Will 

wait skin biopsy result, but plan for Rituxan infusion this admission as 

steroid sparer. 


DVT prophylaxis (Acute) [id number]


 - Xarelto. 


Full code status (Acute) Z78.9


Neutropenia (Acute) D70.9


 - Resolved with Neupogen. - D/w Heme - impression is his pancytopenia is 

secondary to a rheumatological process.  


Neutropenic fever (Acute) D70.9, R50.81


 - Unclear if this is infectious (patient immunnosuppressed on high dose 

steroids) or related to auto-immune disorder. - Hematology, ID, and Derm 

consults appreciated. - Antibiotics discontinued at this time. - Derm suspects 

Sweet syndrome and he has had significant improvement with steroids. 


Rash (Acute) R21


 - Autoimmune vs viral (less likely). Derm suspects Sweet syndrome. - Follow up 

biopsy. 








Current Medications: 


 Current Medications





Acetaminophen (Tylenol Tab*)  650 mg PO Q4H PRN


   PRN Reason: FEVER/PAIN


Amitriptyline HCl (Elavil Tab*)  20 mg PO BEDTIME [facility]


   Last Admin: 06/13/18 20:56 Dose:  20 mg


Docusate Sodium (Colace Cap*)  100 mg PO DAILY PRN


   PRN Reason: CONSTIPATION


   Last Admin: 06/13/18 20:56 Dose:  100 mg


Lorazepam (Ativan Tab(*))  0.5 mg PO Q6H PRN


   PRN Reason: ANXIETY


   Last Admin: 06/13/18 00:08 Dose:  0.5 mg


Magnesium Hydroxide (Milk Of Magnesia Liq*)  30 ml PO DAILY PRN


   PRN Reason: CONSTIPATION


   Last Admin: 06/13/18 20:56 Dose:  30 ml


Methylprednisolone Sodium Succinate (Solu-Medrol 125mg *)  60 mg IV Q8H [facility]


   Last Admin: 06/14/18 16:44 Dose:  60 mg


Mometasone Furoate/Formoterol Fumar (Dulera 100/5 Mdi*)  2 puff INH BID [facility]


   Last Admin: 06/14/18 08:07 Dose:  2 puff


Omeprazole (Prilosec Cap*)  20 mg PO DAILY [facility]


   Last Admin: 06/14/18 09:27 Dose:  20 mg


Oxybutynin Chloride (Ditropan Xl Tab*)  10 mg PO DAILY [facility]


   Last Admin: 06/14/18 09:27 Dose:  10 mg


Propranolol HCl (Inderal La Cap*)  80 mg PO DAILY PRN


   PRN Reason: Anxiety


   Last Admin: 06/12/18 17:54 Dose:  80 mg


Rivaroxaban (Xarelto(*))  20 mg PO DAILY [facility]


   Last Admin: 06/14/18 09:27 Dose:  20 mg











- Review of Systems


Constitutional Symptoms: No: Weight Gain, Weight Loss, Weakness, Fatigue, Fever


Dermatology: Skin Lesions: Yes, Change in Skin Lesion: Yes - Improved


HEENT: Yes Normal


Eyes: Positive: Normal


Pulmonary: Positive: Normal


Cardiology: Positive: Normal


Gastroenterology: Positive: Normal


Musculoskeletal: Positive: Other - No joint swelling or pain


Endocrinology: Positive: Normal


Hematologic/Lymphatic: Positive: Anemia


Neurology: Positive: Normal


Allergic/Immunologic: Positive: Immunocompromise


Home Medications: 


 Home Medications











 Medication  Instructions  Recorded  Confirmed  Type


 


Amitriptyline TAB* [Elavil TAB*] 20 mg PO BEDTIME 06/11/18 06/11/18 History


 


Budesonide/Formote 80/4.5(NF) 2 puff INH BID 06/11/18 06/11/18 History





[Symbicort 80/4.5 (NF)]    


 


Calcium Carbonate/Vitamin D3 1 tab PO DAILY 06/11/18 06/11/18 History





[Calcium 600 + Vit D Tablet]    


 


Ciprofloxacin TAB* [Cipro 500 MG 1 tab PO BID 06/11/18 06/11/18 History





TAB*]    


 


Docusate CAP* [Colace Cap*] 100 mg PO DAILY PRN 06/11/18 06/11/18 History


 


Ibuprofen TAB* [Advil TAB*] 400 mg PO Q6HR PRN 06/11/18 06/11/18 History


 


LORazepam TAB(*) [Ativan 0.5 MG 0.5 mg PO Q6H PRN 06/11/18 06/11/18 History





TAB (*)]    


 


Magnesium Hydroxide LIQ* [Milk of 30 ml PO DAILY PRN 06/11/18 06/11/18 History





Magnesia LIQ*]    


 


Omeprazole CAP* [Prilosec CAP* 20 20 mg PO DAILY 06/11/18 06/11/18 History





MG]    


 


Oxybutynin XL TAB* [Ditropan XL 10 mg PO DAILY 06/11/18 06/11/18 History





TAB*]    


 


Rivaroxaban TAB(*) [Xarelto 20 mg] 20 mg PO DAILY 06/11/18 06/11/18 History


 


predniSONE TAB* [Deltasone 10 MG 30 mg PO DAILY 06/11/18 06/11/18 History





TAB*]    











Allergies: 


 Allergies











Allergy/AdvReac Type Severity Reaction Status Date / Time


 


allopurinol Allergy  Rash Verified 06/11/18 11:47


 


azithromycin [From Zithromax] Allergy  Rash Verified 06/11/18 11:47


 


ssri Allergy Intermediate Hallucinati Uncoded 06/11/18 11:47





   ons  














Objective





- Vital Signs


Vital Signs: 


 Vital Signs











  06/13/18 06/13/18 06/13/18





  19:04 19:22 21:01


 


Temperature 98.0 F  


 


Pulse Rate 92 70 


 


Respiratory 22 14 18





Rate   


 


Blood Pressure 145/63  





(mmHg)   


 


O2 Sat by Pulse 100 100 





Oximetry   














  06/13/18 06/13/18 06/14/18





  21:26 23:25 04:27


 


Temperature  98.5 F 97.5 F


 


Pulse Rate  86 80


 


Respiratory 18 17 16





Rate   


 


Blood Pressure  124/59 120/57





(mmHg)   


 


O2 Sat by Pulse  100 100





Oximetry   














  06/14/18 06/14/18





  07:28 15:09


 


Temperature 97.6 F 97.7 F


 


Pulse Rate 77 98


 


Respiratory 16 20





Rate  


 


Blood Pressure 129/63 151/67





(mmHg)  


 


O2 Sat by Pulse 100 100





Oximetry  














- Intake and Output


Intake and Output: 


 Intake & Output











 06/12/18 06/13/18 06/14/18 06/15/18





 06:59 06:59 06:59 06:59


 


Intake Total  4649 3180 1290


 


Output Total   0 


 


Balance  4649 3180 1290


 


Intake:    


 


  IV Fluids  2589 1500 


 


    ns  1689 1500 


 


  IVPB  100  


 


    ns  100  


 


  Oral  1960 1680 1290


 


Output:    


 


  Urine   0 


 


Other:    


 


  Estimated Void   Large Medium


 


  # Bowel Movements  0 0 


 


  # Voids  0 1 3





 





ADLs: Meal  Record                                         Start:  06/11/18 20:

11


Freq:   DAILY@0900,1400,1800                               Status: Active      

  


Protocol:                                                                      

  


 Created      06/11/18 20:11  System  (Rec: 06/11/18 20:11  System  MED-C03)


 Document     06/12/18 09:00  [id number]  (Rec: 06/12/18 09:41  [id number]  MED-C09)


 Document     06/12/18 14:00  [id number]  (Rec: 06/12/18 14:59  [id number]  MED-C02)


 Document     06/12/18 18:00  [id number]  (Rec: 06/12/18 18:34  [id number]  MED-C02)


 Document     06/13/18 09:00  [id number]  (Rec: 06/13/18 09:41  [id number]  MED-C09)


 Document     06/13/18 14:00  [id number]  (Rec: 06/13/18 14:09  [id number]  MED-C14)


 Document     06/13/18 18:00  [id number]  (Rec: 06/13/18 18:28  [id number]  MED-C02)


 Document     06/14/18 08:47  [id number]  (Rec: 06/14/18 08:47  [id number]  MED-C11)


 Document     06/14/18 13:53  [id number]  (Rec: 06/14/18 13:54  [id number]  MED-C11)


Intake and Output                                          Start:  06/11/18 20:

11


Freq:   DAILY@0600,1400,2200                               Status: Active      

  


Protocol:                                                                      

  


 Created      06/11/18 20:11  System  (Rec: 06/11/18 20:11  System  MED-C03)


 Document     06/11/18 21:04  [id number]  (Rec: 06/11/18 21:04  [id number]  MED-C14)


 Document     06/12/18 06:00  [id number]  (Rec: 06/12/18 06:20  [id number]  MED-C14)


 Document     06/12/18 14:00  [id number]  (Rec: 06/12/18 14:59  [id number]  MED-C02)


 Document     06/12/18 21:46  [id number]  (Rec: 06/12/18 21:47  [id number]  MED-C02)


 Document     06/13/18 06:00  [id number]  (Rec: 06/13/18 06:18  [id number]  MEDL-C02)


 Document     06/13/18 14:00  [id number]  (Rec: 06/13/18 14:09  [id number]  MED-C14)


 Document     06/13/18 19:15  [id number]  (Rec: 06/13/18 19:16  [id number]  MED-C02)


 Document     06/14/18 06:00  [id number]  (Rec: 06/14/18 06:08  [id number]  MED-C09)


 Document     06/14/18 13:53  [id number]  (Rec: 06/14/18 13:54  [id number]  MED-C11)











- Physical Exam


General Physical Exam Comment: No acute distress; sitting up


Eye Exam: bilateral: PERRLA


Skin: Abnormal: Rash, Lesions - Fading papules on the hands and legs


Thyroid Function: Clinically Euthyroid


Lungs and Chest: Yes: Chest Expansion Full, Chest Expansion Symetrica, 

Percussion Note Resonant


Heart Rate and Rhythm: Regular


JVP: Not Elevated


[place] Beat: Non Displaced


Additional Cardiovascular: Yes: [place] Beat not Displaced, Normal Heart Sounds


Abdominal Exam: Yes: Soft





- Rheumotological System


Joints: Signs of Connective Tissue Disease





- Extremities


Feet Sensation: Abnormal: Sensory


Reflexes: Bilateral: Biceps





- Neuro


Psychiatric: Normal


Speech: Normal





Results





- Results


Lab Results: 


 Laboratory Results - last 24 hr











  06/13/18 06/14/18





  06:19 08:03


 


WBC   34.1 H


 


RBC   2.40 L


 


Hgb   8.4 L


 


Hct   25 L


 


MCV   106 H


 


MCH   35 H


 


MCHC   33


 


RDW   16 H


 


Plt Count   120 L


 


MPV   7.0 L


 


Neut % (Auto)   96.3 H


 


Lymph % (Auto)   2.1 L


 


Mono % (Auto)   1.5


 


Eos % (Auto)   0


 


Baso % (Auto)   0.1


 


Absolute Neuts (auto)   32.8 H


 


Absolute Lymphs (auto)   0.7 L


 


Absolute Monos (auto)   0.5


 


Absolute Eos (auto)   0


 


Absolute Basos (auto)   0


 


Absolute Nucleated RBC   0


 


Nucleated RBC %   0


 


Hem Pathologist Commnt   














Assessment





- Problem List


Assessment: 


Patient Problems





Autoimmune disorder (Acute)


DVT prophylaxis (Acute)


Full code status (Acute)


Neutropenia (Acute)


Neutropenic fever (Acute)


Rash (Acute)








Plan: 





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

, with pancytopenia and markedly elevated inflammatory markers.  Given his 

positive ANCA, subtle renal abnormalities (which responded to steroids) and 

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  





Given that this condition is similiar to GPA (granulomatosis with polyangitis), 

I recommend a trial of Rituximab 375 mg per meter squared to be given tomorrow 

and weekly for 4 weeks.





I have spoken to Dr. Gonsalves and appreciate his assistance as he will write for 

this tomorrow.  Once he is finished with the infusion, consider discharge on 

prednisone 60mg daily and follow up with me in a few weeks.  He will need to be 

set up for out patient RItuximab infusions.





Sweet syndrome revealing microscopic polyangiitis | Rheumatology ...


https://academic.oup.com/rheumatology/article/[email or web address]/1916/4659156








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


https://www.ncbi.nlm.nih.gov/pmc/articles/EIJ9347605/





Coordination of Care: Yes





- Health Maintenance


14 Systems Reviewed as above all others Negative: Yes


Health Maintenance: Yes: Discussion with Primary Care

## 2018-06-15 LAB
BASOPHILS # BLD AUTO: 0 10^3/UL (ref 0–0.2)
EOSINOPHIL # BLD AUTO: 0 10^3/UL (ref 0–0.6)
HCT VFR BLD AUTO: 25 % (ref 42–52)
HGB BLD-MCNC: 8.7 G/DL (ref 14–18)
LYMPHOCYTES # BLD AUTO: 0.7 10^3/UL (ref 1–4.8)
MCH RBC QN AUTO: 36 PG (ref 27–31)
MCHC RBC AUTO-ENTMCNC: 35 G/DL (ref 31–36)
MCV RBC AUTO: 104 FL (ref 80–94)
MONOCYTES # BLD AUTO: 0.7 10^3/UL (ref 0–0.8)
NEUTROPHILS # BLD AUTO: 14.8 10^3/UL (ref 1.5–7.7)
NRBC # BLD AUTO: 0 10^3/UL
NRBC BLD QL AUTO: 0
PLATELET # BLD AUTO: 106 10^3/UL (ref 150–450)
RBC # BLD AUTO: 2.4 10^6/UL (ref 4–5.4)
WBC # BLD AUTO: 16.2 10^3/UL (ref 3.5–10.8)

## 2018-06-15 PROCEDURE — 07DR3ZX EXTRACTION OF ILIAC BONE MARROW, PERCUTANEOUS APPROACH, DIAGNOSTIC: ICD-10-PCS | Performed by: HOSPITALIST

## 2018-06-15 RX ADMIN — LORAZEPAM PRN MG: 0.5 TABLET ORAL at 09:52

## 2018-06-15 RX ADMIN — OXYBUTYNIN CHLORIDE SCH MG: 5 TABLET, EXTENDED RELEASE ORAL at 10:01

## 2018-06-15 RX ADMIN — METHYLPREDNISOLONE SODIUM SUCCINATE SCH MG: 125 INJECTION, POWDER, FOR SOLUTION INTRAMUSCULAR; INTRAVENOUS at 01:07

## 2018-06-15 RX ADMIN — RIVAROXABAN SCH MG: 20 TABLET, FILM COATED ORAL at 10:01

## 2018-06-15 RX ADMIN — AMITRIPTYLINE HYDROCHLORIDE SCH MG: 10 TABLET, FILM COATED ORAL at 21:02

## 2018-06-15 RX ADMIN — METHYLPREDNISOLONE SODIUM SUCCINATE SCH MG: 125 INJECTION, POWDER, FOR SOLUTION INTRAMUSCULAR; INTRAVENOUS at 10:01

## 2018-06-15 RX ADMIN — METHYLPREDNISOLONE SODIUM SUCCINATE SCH MG: 125 INJECTION, POWDER, FOR SOLUTION INTRAMUSCULAR; INTRAVENOUS at 18:27

## 2018-06-15 RX ADMIN — MOMETASONE FUROATE AND FORMOTEROL FUMARATE DIHYDRATE SCH PUFF: 100; 5 AEROSOL RESPIRATORY (INHALATION) at 08:11

## 2018-06-15 RX ADMIN — MOMETASONE FUROATE AND FORMOTEROL FUMARATE DIHYDRATE SCH PUFF: 100; 5 AEROSOL RESPIRATORY (INHALATION) at 19:58

## 2018-06-15 RX ADMIN — OMEPRAZOLE SCH MG: 20 CAPSULE, DELAYED RELEASE ORAL at 10:01

## 2018-06-15 NOTE — PROCNOTE
Hematology/Oncology Procedure


Hematology/Oncology Procedure Note: 





Bone marrow biopsy and aspiration:


Written informed consent obtained.  Time out completed.  Patient placed in R 

lateral decubitus position.  L PSIS identified.  1% lidocaine used for local 

anesthesia.  Bone marrow aspiration and core biopsy successfully completed.  

Local hemostasis achieved.

## 2018-06-15 NOTE — PN
Subjective


Date of Service: 06/15/18


Interval History: 


HOSPITALIST PROGRESS NOTE


Patient seen and examined at bedside. Care reviewed and d/w Iesha Parmar RN.


He feels much better today, rash is much improved.


Family History: Unchanged from Admission


Social History: Unchanged from Admission


Past Medical History: Unchanged from Admission





Objective


Active Medications: 








Acetaminophen (Tylenol Tab*)  650 mg PO Q4H PRN


   PRN Reason: FEVER/PAIN


Amitriptyline HCl (Elavil Tab*)  20 mg PO BEDTIME [facility]


   Last Admin: 06/14/18 20:37 Dose:  20 mg


Docusate Sodium (Colace Cap*)  100 mg PO DAILY PRN


   PRN Reason: CONSTIPATION


   Last Admin: 06/14/18 20:37 Dose:  100 mg


Lorazepam (Ativan Tab(*))  0.5 mg PO Q6H PRN


   PRN Reason: ANXIETY


   Last Admin: 06/15/18 09:52 Dose:  0.5 mg


Magnesium Hydroxide (Milk Of Magnesia Liq*)  30 ml PO DAILY PRN


   PRN Reason: CONSTIPATION


   Last Admin: 06/14/18 20:37 Dose:  30 ml


Methylprednisolone Sodium Succinate (Solu-Medrol 125mg *)  60 mg IV Q8H [facility]


   Last Admin: 06/15/18 10:01 Dose:  60 mg


Mometasone Furoate/Formoterol Fumar (Dulera 100/5 Mdi*)  2 puff INH BID [facility]


   Last Admin: 06/15/18 08:11 Dose:  2 puff


Omeprazole (Prilosec Cap*)  20 mg PO DAILY [facility]


   Last Admin: 06/15/18 10:01 Dose:  20 mg


Oxybutynin Chloride (Ditropan Xl Tab*)  10 mg PO DAILY [facility]


   Last Admin: 06/15/18 10:01 Dose:  10 mg


Propranolol HCl (Inderal La Cap*)  80 mg PO DAILY PRN


   PRN Reason: Anxiety


   Last Admin: 06/12/18 17:54 Dose:  80 mg


Rivaroxaban (Xarelto(*))  20 mg PO DAILY [facility]


   Last Admin: 06/15/18 10:01 Dose:  20 mg








 Vital Signs - 8 hr











  06/15/18 06/15/18 06/15/18





  08:13 09:52 11:16


 


Pulse Rate 83  


 


Respiratory 16 16 16





Rate   


 


O2 Sat by Pulse 99  





Oximetry   











Oxygen Devices in Use Now: None


Appearance: Pleasant gentleman sitting up in bed in NAD.


Eyes: PERRLA


Ears/Nose/Mouth/Throat: Mucous Membranes Moist


Neck: Trachea Midline


Extremities: No Edema


Skin: - - Rash is much improved.


Neurological: Alert and Oriented x 3


Result Diagrams: 


 06/15/18 06:23





 06/13/18 06:19





Assess/Plan/Problems-Billing


Assessment: 


Mr. Mehta is a 74yo F with PMH of HTN, BPH, MRSA pneumonia with subsequent brain 

abscess, PE/DVT and portal vein thrombosis, prostate CA, pulmonary nodules, who 

presented to ED with fever and rash.





- Patient Problems


(1) Neutropenia


Comment: - Resolved with Neupogen.


- D/w Heme - impression is his pancytopenia is secondary to a rheumatological 

process.


   





(2) Autoimmune disorder


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 

(p ANCA positive 03/18 but MPO and PR3 negative) vs other.


- Also has h/o DVT/PE, portal vein thrombosis with positive anticardiolipine 

IgM in 12/17, suggestive of antiphospholipid syndrome.


- Autoimmune tests negative so far, except for low C3.


- D/w Dr. Diamond - impression is this is ANCA related vasculitis. 


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


- Plan for first Rituxan infusion today, then weekly to complete 4 weeks.   





(3) Rash


Comment: - Autoimmune vs viral (less likely). 


- Path showed neutrophilic dermatitis compatible with Sweet syndrome.   





(4) DVT prophylaxis


Comment: - Xarelto.   





(5) Full code status





Status and Disposition: 


Inpatient. Anticipate d/c in AM.

## 2018-06-16 VITALS — SYSTOLIC BLOOD PRESSURE: 138 MMHG | DIASTOLIC BLOOD PRESSURE: 63 MMHG

## 2018-06-16 RX ADMIN — METHYLPREDNISOLONE SODIUM SUCCINATE SCH MG: 125 INJECTION, POWDER, FOR SOLUTION INTRAMUSCULAR; INTRAVENOUS at 08:35

## 2018-06-16 RX ADMIN — OXYBUTYNIN CHLORIDE SCH MG: 5 TABLET, EXTENDED RELEASE ORAL at 08:35

## 2018-06-16 RX ADMIN — RIVAROXABAN SCH MG: 20 TABLET, FILM COATED ORAL at 08:35

## 2018-06-16 RX ADMIN — OMEPRAZOLE SCH MG: 20 CAPSULE, DELAYED RELEASE ORAL at 08:35

## 2018-06-16 RX ADMIN — MOMETASONE FUROATE AND FORMOTEROL FUMARATE DIHYDRATE SCH PUFF: 100; 5 AEROSOL RESPIRATORY (INHALATION) at 07:43

## 2018-06-16 RX ADMIN — METHYLPREDNISOLONE SODIUM SUCCINATE SCH MG: 125 INJECTION, POWDER, FOR SOLUTION INTRAMUSCULAR; INTRAVENOUS at 01:20

## 2018-06-16 NOTE — DS
CC:  Dr. Villanueva; Dr. Diamond; Dr. Gonsalves; Dr. Husain; Dr. Dumont

 

DISCHARGE SUMMARY:

 

DATE OF ADMISSION:  06/11/18.

 

DATE OF DISCHARGE:  06/16/18.

 

PRIMARY CARE PROVIDER:  Dr. Villanueva.

 

RHEUMATOLOGIST:  Dr. Diamond.

 

HEMATOLOGIST:  Dr. Gonsalves.

 

DERMATOLOGIST:  Dr. Husain.

 

GENERAL SURGEON:  Dr. Dumont.

 

DISCHARGE DIAGNOSES:

1.  ANCA associated vasculitis.

2.  Pancytopenia.

3.  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

 

SECONDARY DIAGNOSES:

1.  Hypertension.

2.  Benign prostatic hyperplasia.

3.  History of methicillin-resistant Staphylococcus aureus pneumonia with 
subsequent brain abscess.

4.  Pulmonary embolism/deep vein thrombosis and portal vein thrombosis.

5.  Prostate cancer.

 

MEDICATIONS:

1.  Xarelto 10 mg p.o. daily.

2.  Oxybutynin XL 10 mg p.o. daily.

3.  Omeprazole 20 mg p.o. daily.

4.  Milk of magnesia 30 mL p.o. daily as needed for constipation.

5.  Lorazepam 25 mg p.o. q. 6 hours p.r.n. anxiety.

6.  Colace 100 mg p.o. daily as needed for constipation.

7.  Calcium plus vitamin D one tablet p.o. daily.

8.  Symbicort 80/12.5 two puffs inhaled b.i.d.

9.  Amitriptyline 10 mg p.o. at bedtime.

10.  Ibuprofen 400 mg p.o. q. 6 hours pain and fever.

11.  Propranolol LA 80 mg p.o. daily as needed for anxiety.

 

Medication change:  Prednisone was increased to 60 mg p.o. daily.

 

HOSPITAL COURSE:  Mr. Mehta is a 73-year-old male with a past medical history as 
stated above that has had an extensive workup as outpatient for weight loss, 
fatigue, and pancytopenia.  He had a tentative diagnosis of ANCA related 
vasculitis as he had a positive pANCA in March 2018.  The patient presented to 
the emergency room with complaints of fever, worsening fatigue, and a macular 
pustular rash especially on his hand.  For more details about his presentation, 
I refer you to his history and physical.

 

The patient was seen in consultation by Rheumatology (Dr. Diamond) and his 
recommendation was to increase his steroids to prednisolone 60 mg IV q. 8 and 
to get Hematology and Dermatology involved.

 

The patient was seen in consultation by Dermatology (Dr. Husain), and her 
impression was that the patient had Sweet's syndrome.  A skin biopsy was 
performed and the pathology report revealed a neutrophilic dermatosis with no 
evidence of leukocytoclastic vasculitis, most compatible with Sweet's syndrome.
  The patient had a dramatic response to steroids and his rash is almost 
resolved on the day of discharge.  He was also seen by Infectious Disease (Dr. Jackson) and his workup including anaplasma, babesia, borrelia, and ehrlichia 
were all negative.  At the time of this dictation coxsackie and parvovirus 
serologies are still pending and the results need to be followed as outpatient.

 

The patient was pancytopenic on admission and his counts worsened with 
significant neutropenia.  He was seen in consultation by Hematology (Dr. Gonsalves), 
who recommended repeat bone marrow biopsy that the results are still pending at 
the time of this dictation and also one does of Neupogen.  The patient's cell 
count recovered and on the last CBC prior to discharge, he had a white cell 
count of 16, hemoglobin of 8.7 and platelet count of 106.

 

After steroids were started the patient remained afebrile, had improvement of 
his rash and he feels much improved and is anxious to be discharged.  He was 
seen in followup by Dr. Diamond and his recommendation was to start Rituxan 
infusions once a week to complete 4 weeks and for the patient to be discharged 
home on prednisone 60 mg.  The patient received his first Rituxan infusion on 06
/15/18, and his next infusion is scheduled for 06/21/18.

 

The patient is medically stable for discharge at this time to follow up with 
Dr. Villanueva, Dr. Diamond, and Dr. Gonsalves as an outpatient.

 

PHYSICAL EXAMINATION:  Vital Signs:  Temperature is 97.3, heart rate 67, 
respiratory rate is 18, oxygen saturation 100% on room air, blood pressure is 
138/63.  General:  The patient is a pleasant, elderly gentleman, sitting up in 
the chair, in no acute distress.  CVS:  Normal S1, S2.  Regular rate and 
rhythm. Chest:  Breath sound present bilaterally with no added sounds.  
Extremities:  No edema.  Skin:  The patient has a very mild macular rash on his 
hands.  Neuro:  He is alert, oriented, x3.  Able to move all 4 extremities.

 

DIET:  Heart-healthy diet.

 

ACTIVITY:  As tolerated.

 

DISPOSITION:  To home.

 

STATUS WHILE IN THE HOSPITAL:  Inpatient.

 

Please keep in mind this is a summarized version of this patient's complex 
hospital stay.  If you need more information, please feel free to call me at [phone]
[phone] or please obtain the full medical records.

 

TIME SPENT:  Approximately 45 minutes was spent to complete this discharge.

 

 [id number]/CPS #: [id number]

DEYA

## 2021-11-04 NOTE — PN
Subjective





- Subjective


Date of Service: 06/15/18 - Chief complaint : skin lesions


Active Problems: 


 Active Problems





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 

positive 03/18 but MPO and PR3 negative) vs other. - Also has h/o DVT/PE, 

portal vein thrombosis with positive anticardiolipine IgM in 12/17, suggestive 

of antiphospholipid syndrome. - Autoimmune tests negative so far, except for 

low C3. - D/w Dr. Diamond - impression is this is ANCA related vasculitis. - 

Skin biopsy showed neutrophilic dermatosis compatible with Sweet syndrome. - 

Plan for first Rituxan infusion today, then weekly to complete 4 weeks. 


DVT prophylaxis (Acute) [id number]


 - Xarelto. 


Full code status (Acute) Z78.9


Neutropenia (Acute) D70.9


 - Resolved with Neupogen. - D/w Heme - impression is his pancytopenia is 

secondary to a rheumatological process.  


Neutropenic fever (Acute) D70.9, R50.81


 - Unclear if this is infectious (patient immunnosuppressed on high dose 

steroids) or related to auto-immune disorder. - Hematology, ID, and Derm 

consults appreciated. - Antibiotics discontinued at this time. - Derm suspects 

Sweet syndrome and he has had significant improvement with steroids. 


Rash (Acute) R21


 - Autoimmune vs viral (less likely). - Path showed neutrophilic dermatitis 

compatible with Sweet syndrome. 








Current Medications: 


 Current Medications





Acetaminophen (Tylenol Tab*)  650 mg PO Q4H PRN


   PRN Reason: FEVER/PAIN


Amitriptyline HCl (Elavil Tab*)  20 mg PO BEDTIME KRISTAL


   Last Admin: 06/14/18 20:37 Dose:  20 mg


Docusate Sodium (Colace Cap*)  100 mg PO DAILY PRN


   PRN Reason: CONSTIPATION


   Last Admin: 06/14/18 20:37 Dose:  100 mg


Lorazepam (Ativan Tab(*))  0.5 mg PO Q6H PRN


   PRN Reason: ANXIETY


   Last Admin: 06/15/18 09:52 Dose:  0.5 mg


Magnesium Hydroxide (Milk Of Magnesia Liq*)  30 ml PO DAILY PRN


   PRN Reason: CONSTIPATION


   Last Admin: 06/14/18 20:37 Dose:  30 ml


Meperidine HCl (Demerol Carpuject*)  25 mg IV ONCE PRN


   PRN Reason: rigors


Methylprednisolone Sodium Succinate (Solu-Medrol 125mg *)  60 mg IV Q8H [facility]


   Last Admin: 06/15/18 10:01 Dose:  60 mg


Mometasone Furoate/Formoterol Fumar (Dulera 100/5 Mdi*)  2 puff INH BID [facility]


   Last Admin: 06/15/18 08:11 Dose:  2 puff


Omeprazole (Prilosec Cap*)  20 mg PO DAILY [facility]


   Last Admin: 06/15/18 10:01 Dose:  20 mg


Oxybutynin Chloride (Ditropan Xl Tab*)  10 mg PO DAILY [facility]


   Last Admin: 06/15/18 10:01 Dose:  10 mg


Propranolol HCl (Inderal La Cap*)  80 mg PO DAILY PRN


   PRN Reason: Anxiety


   Last Admin: 06/12/18 17:54 Dose:  80 mg


Rivaroxaban (Xarelto(*))  20 mg PO DAILY [facility]


   Last Admin: 06/15/18 10:01 Dose:  20 mg











- Review of Systems


General Comments: 





Mr. Mehta overall is feeling well; he is receiving and tolerating Rituximab well

; skin lesions are nearly completely gone


Constitutional Symptoms: No: Weight Gain, Fatigue


Dermatology: Change in Skin Lesion: Yes - Improved skin rash


Eyes: Positive: Normal


Thyroid: Positive: Normal


Pulmonary: Positive: Normal


Cardiology: Positive: Normal


Gastroenterology: Positive: Normal


Endocrinology: Positive: Normal


Hematologic/Lymphatic: Positive: Anemia


Neurology: Positive: Normal


Psychiatry: Positive: Normal


Allergic/Immunologic: Positive: Immunocompromise


Home Medications: 


 Home Medications











 Medication  Instructions  Recorded  Confirmed  Type


 


Amitriptyline TAB* [Elavil TAB*] 20 mg PO BEDTIME 06/11/18 06/11/18 History


 


Budesonide/Formote 80/4.5(NF) 2 puff INH BID 06/11/18 06/11/18 History





[Symbicort 80/4.5 (NF)]    


 


Calcium Carbonate/Vitamin D3 1 tab PO DAILY 06/11/18 06/11/18 History





[Calcium 600 + Vit D Tablet]    


 


Ciprofloxacin TAB* [Cipro 500 MG 1 tab PO BID 06/11/18 06/11/18 History





TAB*]    


 


Docusate CAP* [Colace Cap*] 100 mg PO DAILY PRN 06/11/18 06/11/18 History


 


Ibuprofen TAB* [Advil TAB*] 400 mg PO Q6HR PRN 06/11/18 06/11/18 History


 


LORazepam TAB(*) [Ativan 0.5 MG 0.5 mg PO Q6H PRN 06/11/18 06/11/18 History





TAB (*)]    


 


Magnesium Hydroxide LIQ* [Milk of 30 ml PO DAILY PRN 06/11/18 06/11/18 History





Magnesia LIQ*]    


 


Omeprazole CAP* [Prilosec CAP* 20 20 mg PO DAILY 06/11/18 06/11/18 History





MG]    


 


Oxybutynin XL TAB* [Ditropan XL 10 mg PO DAILY 06/11/18 06/11/18 History





TAB*]    


 


Rivaroxaban TAB(*) [Xarelto 20 mg] 20 mg PO DAILY 06/11/18 06/11/18 History


 


predniSONE TAB* [Deltasone 10 MG 30 mg PO DAILY 06/11/18 06/11/18 History





TAB*]    











Allergies: 


 Allergies











Allergy/AdvReac Type Severity Reaction Status Date / Time


 


allopurinol Allergy  Rash Verified 06/11/18 11:47


 


azithromycin [From Zithromax] Allergy  Rash Verified 06/11/18 11:47


 


ssri Allergy Intermediate Hallucinati Uncoded 06/11/18 11:47





   ons  














Objective





- Vital Signs


Vital Signs: 


 Vital Signs











  06/14/18 06/14/18 06/14/18





  19:23 20:00 23:04


 


Temperature 97.8 F  97.7 F


 


Pulse Rate 85  72


 


Respiratory 18 18 18





Rate   


 


Blood Pressure 137/66  136/73





(mmHg)   


 


O2 Sat by Pulse 100  100





Oximetry   














  06/15/18 06/15/18 06/15/18





  03:48 07:14 08:00


 


Temperature 97.7 F 97.4 F 


 


Pulse Rate 68 67 


 


Respiratory 16 18 16





Rate   


 


Blood Pressure 152/66 134/66 





(mmHg)   


 


O2 Sat by Pulse 100 100 99





Oximetry   














  06/15/18 06/15/18 06/15/18





  08:13 09:52 11:16


 


Temperature   


 


Pulse Rate 83  


 


Respiratory 16 16 16





Rate   


 


Blood Pressure   





(mmHg)   


 


O2 Sat by Pulse 99  





Oximetry   














  06/15/18 06/15/18 06/15/18





  11:22 13:48 14:15


 


Temperature 97.5 F  


 


Pulse Rate 87  


 


Respiratory 18 16 16





Rate   


 


Blood Pressure 144/73  





(mmHg)   


 


O2 Sat by Pulse 100  





Oximetry   














  06/15/18 06/15/18 06/15/18





  15:00 15:30 16:00


 


Temperature 97.3 F 97.6 F 97.6 F


 


Pulse Rate 82 82 78


 


Respiratory   





Rate   


 


Blood Pressure 151/72 166/81 164/81





(mmHg)   


 


O2 Sat by Pulse   





Oximetry   














  06/15/18 06/15/18





  16:30 17:04


 


Temperature 97.9 F 98.3 F


 


Pulse Rate 93 83


 


Respiratory  





Rate  


 


Blood Pressure 151/74 148/74





(mmHg)  


 


O2 Sat by Pulse  





Oximetry  














- Intake and Output


Intake and Output: 


 Intake & Output











 06/13/18 06/14/18 06/15/18 06/16/18





 06:59 06:59 06:59 06:59


 


Intake Total 4649 3180 1290 830


 


Output Total  0  


 


Balance 4649 3180 1290 830


 


Intake:    


 


  IV Fluids 2589 1500  


 


    ns 1689 1500  


 


  IVPB 100   


 


    ns 100   


 


  Oral 1960 1680 1290 830


 


Output:    


 


  Urine  0  


 


Other:    


 


  Estimated Void  Large Medium Medium


 


  Date of Last Bowel   0 





  Movement    


 


  # Bowel Movements 0 0 0 1


 


  Estimated Stool Amount    Medium


 


  # Voids 0 1 0 3





 





ADLs: Meal  Record                                         Start:  06/11/18 20:

11


Freq:   DAILY@0900,1400,1800                               Status: Active      

  


Protocol:                                                                      

  


 Created      06/11/18 20:11  System  (Rec: 06/11/18 20:11  System  MED-C03)


 Document     06/12/18 09:00  [id number]  (Rec: 06/12/18 09:41  [id number]  MED-C09)


 Document     06/12/18 14:00  [id number]  (Rec: 06/12/18 14:59  [id number]  MED-C02)


 Document     06/12/18 18:00  [id number]  (Rec: 06/12/18 18:34  [id number]  MED-C02)


 Document     06/13/18 09:00  [id number]  (Rec: 06/13/18 09:41  [id number]  MED-C09)


 Document     06/13/18 14:00  [id number]  (Rec: 06/13/18 14:09  [id number]  MED-C14)


 Document     06/13/18 18:00  [id number]  (Rec: 06/13/18 18:28  [id number]  MED-C02)


 Document     06/14/18 08:47  [id number]  (Rec: 06/14/18 08:47  [id number]  MED-C11)


 Document     06/14/18 13:53  [id number]  (Rec: 06/14/18 13:54  [id number]  MED-C11)


 Document     06/15/18 09:00  [id number]  (Rec: 06/15/18 10:23  [id number]  MED-C09)


 Document     06/15/18 13:40  [id number]  (Rec: 06/15/18 13:41  [id number]  MED-C09)


Intake and Output                                          Start:  06/11/18 20:

11


Freq:   DAILY@0600,1400,2200                               Status: Active      

  


Protocol:                                                                      

  


 Created      06/11/18 20:11  System  (Rec: 06/11/18 20:11  System  MED-C03)


 Document     06/11/18 21:04  [id number]  (Rec: 06/11/18 21:04  [id number]  MED-C14)


 Document     06/12/18 06:00  [id number]  (Rec: 06/12/18 06:20  [id number]  MED-C14)


 Document     06/12/18 14:00  [id number]  (Rec: 06/12/18 14:59  [id number]  MED-C02)


 Document     06/12/18 21:46  [id number]  (Rec: 06/12/18 21:47  [id number]  MED-C02)


 Document     06/13/18 06:00  [id number]  (Rec: 06/13/18 06:18  [id number]  MEDL-C02)


 Document     06/13/18 14:00  [id number]  (Rec: 06/13/18 14:09  [id number]  MED-C14)


 Document     06/13/18 19:15  [id number]  (Rec: 06/13/18 19:16  [id number]  MED-C02)


 Document     06/14/18 06:00  [id number]  (Rec: 06/14/18 06:08  [id number]  MED-C09)


 Document     06/14/18 13:53  [id number]  (Rec: 06/14/18 13:54  [id number]  MED-C11)


 Document     06/14/18 22:00  [id number]  (Rec: 06/14/18 22:12  [id number]  MED-C09)


 Document     06/15/18 06:00  [id number]  (Rec: 06/15/18 06:25  [id number]  MED-C07)


 Document     06/15/18 13:41  [id number]  (Rec: 06/15/18 14:20  [id number]  MED-C09)











- Physical Exam


General Physical Exam Comment: No acute distress; comfortable


Eye Exam: bilateral: PERRLA


Skin: Abnormal: Rash - largely faded away


Head: Yes Normocephalic


Nasal Exam: Bilateral: Normal


Throat/Oropharyx Exam: Bilateral: Normal


Thyroid Function: Clinically Euthyroid


Lungs and Chest: Yes: Chest Expansion Full, Chest Expansion Symetrica, 

Percussion Note Resonant


Heart Rate and Rhythm: Regular


JVP: Not Elevated


[place] Beat: Non Displaced


Additional Cardiovascular: Yes: [place] Beat not Displaced, Normal Heart Sounds


Abdominal Exam: Yes: Soft





- Rheumotological System


Joints: Signs of Connective Tissue Disease





- Extremities


Posterior Tibial Pulse: Bilateral Normal


Limbs: Normal Power





- Neuro


Psychiatric: Normal


Speech: Normal





Results





- Results


Lab Results: 


 Laboratory Results - last 24 hr











  06/13/18 06/15/18





  06:19 06:23


 


WBC   16.2 H


 


RBC   2.40 L


 


Hgb   8.7 L


 


Hct   25 L


 


MCV   104 H


 


MCH   36 H


 


MCHC   35


 


RDW   16 H


 


Plt Count   106 L


 


MPV   7.0 L


 


Neut % (Auto)   91.5 H


 


Lymph % (Auto)   4.1 L


 


Mono % (Auto)   4.3


 


Eos % (Auto)   0


 


Baso % (Auto)   0.1


 


Absolute Neuts (auto)   14.8 H


 


Absolute Lymphs (auto)   0.7 L


 


Absolute Monos (auto)   0.7


 


Absolute Eos (auto)   0


 


Absolute Basos (auto)   0


 


Absolute Nucleated RBC   0


 


Nucleated RBC %   0


 


Cryofibrinogen  Negative 


 


Cryoglobulin  Negative 














Assessment





- Problem List


Assessment: 


Patient Problems





Autoimmune disorder (Acute)


DVT prophylaxis (Acute)


Full code status (Acute)


Neutropenia (Acute)


Neutropenic fever (Acute)


Rash (Acute) UTI